# Patient Record
Sex: MALE | Race: WHITE | Employment: UNEMPLOYED | URBAN - METROPOLITAN AREA
[De-identification: names, ages, dates, MRNs, and addresses within clinical notes are randomized per-mention and may not be internally consistent; named-entity substitution may affect disease eponyms.]

---
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
## 2020-07-09 ENCOUNTER — APPOINTMENT (OUTPATIENT)
Dept: GENERAL RADIOLOGY | Age: 20
End: 2020-07-09
Payer: COMMERCIAL

## 2020-07-09 ENCOUNTER — HOSPITAL ENCOUNTER (EMERGENCY)
Age: 20
Discharge: HOME | End: 2020-07-09
Attending: EMERGENCY MEDICINE
Payer: COMMERCIAL

## 2020-07-09 VITALS
HEART RATE: 62 BPM | HEIGHT: 73 IN | BODY MASS INDEX: 20.81 KG/M2 | RESPIRATION RATE: 14 BRPM | WEIGHT: 157 LBS | SYSTOLIC BLOOD PRESSURE: 116 MMHG | OXYGEN SATURATION: 98 % | DIASTOLIC BLOOD PRESSURE: 59 MMHG

## 2020-07-09 LAB
ANION GAP SERPL CALCULATED.3IONS-SCNC: 13 MMOL/L (ref 3–16)
BASOPHILS ABSOLUTE: 0 K/UL (ref 0–0.2)
BASOPHILS RELATIVE PERCENT: 0.3 %
BUN BLDV-MCNC: 14 MG/DL (ref 7–20)
CALCIUM SERPL-MCNC: 9.9 MG/DL (ref 8.3–10.6)
CHLORIDE BLD-SCNC: 98 MMOL/L (ref 99–110)
CO2: 26 MMOL/L (ref 21–32)
CREAT SERPL-MCNC: 0.9 MG/DL (ref 0.9–1.3)
EKG ATRIAL RATE: 57 BPM
EKG DIAGNOSIS: NORMAL
EKG P AXIS: 81 DEGREES
EKG P-R INTERVAL: 132 MS
EKG Q-T INTERVAL: 426 MS
EKG QRS DURATION: 100 MS
EKG QTC CALCULATION (BAZETT): 414 MS
EKG R AXIS: 90 DEGREES
EKG T AXIS: 67 DEGREES
EKG VENTRICULAR RATE: 57 BPM
EOSINOPHILS ABSOLUTE: 0.1 K/UL (ref 0–0.6)
EOSINOPHILS RELATIVE PERCENT: 1.2 %
GFR AFRICAN AMERICAN: >60
GFR NON-AFRICAN AMERICAN: >60
GLUCOSE BLD-MCNC: 103 MG/DL (ref 70–99)
HCT VFR BLD CALC: 45.7 % (ref 40.5–52.5)
HEMOGLOBIN: 15.8 G/DL (ref 13.5–17.5)
LYMPHOCYTES ABSOLUTE: 2.5 K/UL (ref 1–5.1)
LYMPHOCYTES RELATIVE PERCENT: 34.3 %
MCH RBC QN AUTO: 31.1 PG (ref 26–34)
MCHC RBC AUTO-ENTMCNC: 34.6 G/DL (ref 31–36)
MCV RBC AUTO: 89.9 FL (ref 80–100)
MONOCYTES ABSOLUTE: 0.6 K/UL (ref 0–1.3)
MONOCYTES RELATIVE PERCENT: 8.2 %
NEUTROPHILS ABSOLUTE: 4.1 K/UL (ref 1.7–7.7)
NEUTROPHILS RELATIVE PERCENT: 56 %
PDW BLD-RTO: 13.2 % (ref 12.4–15.4)
PLATELET # BLD: 259 K/UL (ref 135–450)
PMV BLD AUTO: 7.7 FL (ref 5–10.5)
POTASSIUM SERPL-SCNC: 3.6 MMOL/L (ref 3.5–5.1)
RBC # BLD: 5.09 M/UL (ref 4.2–5.9)
SODIUM BLD-SCNC: 137 MMOL/L (ref 136–145)
TROPONIN: <0.01 NG/ML
WBC # BLD: 7.4 K/UL (ref 4–11)

## 2020-07-09 PROCEDURE — 84484 ASSAY OF TROPONIN QUANT: CPT

## 2020-07-09 PROCEDURE — 80048 BASIC METABOLIC PNL TOTAL CA: CPT

## 2020-07-09 PROCEDURE — 99285 EMERGENCY DEPT VISIT HI MDM: CPT

## 2020-07-09 PROCEDURE — 93010 ELECTROCARDIOGRAM REPORT: CPT | Performed by: INTERNAL MEDICINE

## 2020-07-09 PROCEDURE — 71046 X-RAY EXAM CHEST 2 VIEWS: CPT

## 2020-07-09 PROCEDURE — 93005 ELECTROCARDIOGRAM TRACING: CPT | Performed by: EMERGENCY MEDICINE

## 2020-07-09 PROCEDURE — 85025 COMPLETE CBC W/AUTO DIFF WBC: CPT

## 2020-07-09 NOTE — ED PROVIDER NOTES
[address] [facility]  ED PROVIDER NOTE    Patient Identification  Pt Name: Richard Moran  MRN: [id number]  Tim 2000  Date of evaluation: 7/9/2020  Provider: Be Gonzales MD  PCP: FORD Mohamud - CNP    Chief Complaint  Chest Pain (patient reports  previous hx of svt, reports that chest pain started 90 minutes ago. )      HPI  History provided by patient   This is a 20 y.o. male who presents to the ED for chest pain. Sharp in character. Seems to have improved by now. Occurred while at rest.  Nonexertional.  Nonpleuritic. No unilateral leg pain or swelling. No fevers or chills or cough. Was feeling dizzy at the time. Had slight nausea. Never had before. Does have history of SVT but this felt different. Did not have palpitations. ROS  10 systems reviewed, pertinent positives/negatives per HPI otherwise noted to be negative. I have reviewed the following nursing documentation:  Allergies: Patient has no known allergies. Past medical history:   Past Medical History:   Diagnosis Date    Migraines     migraines- saw neurology    SVT (supraventricular tachycardia) ([facility] [address] 2017    cardiac cath with ablation     Past surgical history:   Past Surgical History:   Procedure Laterality Date    VENTRICULAR ABLATION SURGERY N/A        Home medications:   Previous Medications    No medications on file       Social history:  reports that he has never smoked. He has never used smokeless tobacco. He reports current alcohol use. He reports that he does not use drugs.     Family history:    Family History   Problem Relation Age of Onset    Other Mother     No Known Problems Father     No Known Problems Brother     Other Maternal Grandmother         anxiety    Thyroid Disease Maternal Grandmother     Arthritis Paternal Grandmother         RA    High Blood Pressure Paternal Grandfather     Thyroid Disease Paternal Grandfather     No Known Problems Brother     Colon Cancer Neg Hx     Breast Cancer Neg Hx     Arrhythmia Neg Hx     Prostate Cancer Neg Hx          Exam  ED Triage Vitals [07/09/20 0347]   BP Temp Temp src Pulse Resp SpO2 Height Weight   (!) 141/90 -- -- 65 16 100 % 6' 1\" (1.854 m) 157 lb (71.2 kg)     Nursing note and vitals reviewed. Constitutional: In no acute distress  HENT:      Head: Normocephalic      Ears: External ears normal.      Nose: Nose normal.     Mouth: Membrane mucosa moist   Eyes: No discharge. Neck: Supple. Trachea midline. Cardiovascular: Regular rate. Warm extremities  Pulmonary/Chest: Effort normal. No respiratory distress. No wheezes. Abdominal: Soft. No distension. Nontender  : Deferred  Rectal: Deferred   Musculoskeletal: Moves all extremities. No gross deformity. Neurological: Alert and oriented. Face symmetric. Speech is clear. Skin: Warm and dry. No rash. Psychiatric: Normal mood and affect. Behavior is normal.    Procedures      EKG  The Ekg interpreted by me in the absence of a cardiologist shows. Willy sinus rhythm. No specific ST-T wave changes appreciated. No evidence of acute ischemia. Radiology  XR CHEST STANDARD (2 VW)    (Results Pending)       Labs  Results for orders placed or performed during the hospital encounter of 07/09/20   CBC Auto Differential   Result Value Ref Range    WBC 7.4 4.0 - 11.0 K/uL    RBC 5.09 4. 20 - 5.90 M/uL    Hemoglobin 15.8 13.5 - 17.5 g/dL    Hematocrit 45.7 40.5 - 52.5 %    MCV 89.9 80.0 - 100.0 fL    MCH 31.1 26.0 - 34.0 pg    MCHC 34.6 31.0 - 36.0 g/dL    RDW 13.2 12.4 - 15.4 %    Platelets 830 090 - 635 K/uL    MPV 7.7 5.0 - 10.5 fL    Neutrophils % 56.0 %    Lymphocytes % 34.3 %    Monocytes % 8.2 %    Eosinophils % 1.2 %    Basophils % 0.3 %    Neutrophils Absolute 4.1 1.7 - 7.7 K/uL    Lymphocytes Absolute 2.5 1.0 - 5.1 K/uL    Monocytes Absolute 0.6 0.0 - 1.3 K/uL    Eosinophils Absolute 0.1 0.0 - 0.6 K/uL    Basophils Absolute 0.0 0.0 - 0.2 K/uL       Screenings           MDM and ED Course  Patient is a 43-year-old man with history of SVT who presents with chest discomfort and dizziness that seems to have improved. EKG shows no acute ischemic changes. Does show bradycardia which is consistent with an athletic appearing male. No history of early cardiac disease in his family. Will obtain troponin x1. No arrhythmia found on EKG. Will watch him on the monitor for any arrhythmia. Will obtain chest x-ray to evaluate for pneumothorax, pneumonia. Low suspicion for these at this time. May be related to anxiety, musculoskeletal, or GERD.  (EMP MDM)    Patient was reassessed. They are feeling well. Objectively they appear to be in no acute distress. Vitals unremarkable for young athletic male. Monitor unremarkable. Discharge instructions, follow up instructions, and return precautions were discussed with the patient and any available family. All questions were answered. [unfilled]    Final Impression  1. Chest pain, unspecified type        Blood pressure (!) 141/90, pulse 65, resp. rate 16, height 6' 1\" (1.854 m), weight 157 lb (71.2 kg), SpO2 100 %. Disposition:  DISPOSITION        Patient Referrals:  No follow-up provider specified. Discharge Medications:  New Prescriptions    No medications on file       Discontinued Medications:  Discontinued Medications    SUMATRIPTAN (IMITREX) 50 MG TABLET    Take 1 tablet by mouth once as needed for Migraine May repeat in 1 hour if no relief. No more than 2 does in 24 hours. VERAPAMIL (VERELAN) 180 MG EXTENDED RELEASE CAPSULE    TAKE 1 CAPSULE BY MOUTH ONE TIME A DAY       This chart was generated using the Dragon dictation system. I created this record but it may contain dictation errors given the limitations of this technology.        Be Gonzales MD  07/09/20 0579

## 2020-07-14 ENCOUNTER — TELEPHONE (OUTPATIENT)
Dept: FAMILY MEDICINE CLINIC | Age: 20
End: 2020-07-14

## 2020-07-20 ENCOUNTER — TELEPHONE (OUTPATIENT)
Dept: FAMILY MEDICINE CLINIC | Age: 20
End: 2020-07-20

## 2020-07-20 PROBLEM — Z86.79 S/P ABLATION OF VENTRICULAR ARRHYTHMIA: Status: ACTIVE | Noted: 2017-08-17

## 2020-07-20 PROBLEM — Z98.890 S/P ABLATION OF VENTRICULAR ARRHYTHMIA: Status: ACTIVE | Noted: 2017-08-17

## 2020-07-20 NOTE — TELEPHONE ENCOUNTER
Called pt   Apt made  Future Appointments   Date Time Provider Aldair Holloway   7/24/2020  1:20 PM Elba Earing, APRN - CNP [facility]  [facility]

## 2020-07-24 ENCOUNTER — OFFICE VISIT (OUTPATIENT)
Dept: FAMILY MEDICINE CLINIC | Age: 20
End: 2020-07-24
Payer: COMMERCIAL

## 2020-07-24 VITALS
DIASTOLIC BLOOD PRESSURE: 72 MMHG | BODY MASS INDEX: 20.32 KG/M2 | RESPIRATION RATE: 18 BRPM | HEART RATE: 96 BPM | WEIGHT: 154 LBS | SYSTOLIC BLOOD PRESSURE: 124 MMHG | OXYGEN SATURATION: 98 % | TEMPERATURE: 99.5 F

## 2020-07-24 LAB
ANION GAP SERPL CALCULATED.3IONS-SCNC: 13 MMOL/L (ref 3–16)
BUN BLDV-MCNC: 13 MG/DL (ref 7–20)
CALCIUM SERPL-MCNC: 10.1 MG/DL (ref 8.3–10.6)
CHLORIDE BLD-SCNC: 105 MMOL/L (ref 99–110)
CO2: 28 MMOL/L (ref 21–32)
CREAT SERPL-MCNC: 0.9 MG/DL (ref 0.9–1.3)
GFR AFRICAN AMERICAN: >60
GFR NON-AFRICAN AMERICAN: >60
GLUCOSE BLD-MCNC: 86 MG/DL (ref 70–99)
POTASSIUM SERPL-SCNC: 4.2 MMOL/L (ref 3.5–5.1)
SODIUM BLD-SCNC: 146 MMOL/L (ref 136–145)

## 2020-07-24 PROCEDURE — 99395 PREV VISIT EST AGE 18-39: CPT | Performed by: NURSE PRACTITIONER

## 2020-07-24 ASSESSMENT — ENCOUNTER SYMPTOMS
NAUSEA: 0
DIARRHEA: 0
SHORTNESS OF BREATH: 0
COLOR CHANGE: 0
SINUS PRESSURE: 0
EYE REDNESS: 0
TROUBLE SWALLOWING: 0
ABDOMINAL PAIN: 0
WHEEZING: 0
COUGH: 0
SORE THROAT: 0
EYE ITCHING: 0
CONSTIPATION: 0
CHEST TIGHTNESS: 0

## 2020-07-24 ASSESSMENT — PATIENT HEALTH QUESTIONNAIRE - PHQ9
1. LITTLE INTEREST OR PLEASURE IN DOING THINGS: 0
SUM OF ALL RESPONSES TO PHQ QUESTIONS 1-9: 0
2. FEELING DOWN, DEPRESSED OR HOPELESS: 0
SUM OF ALL RESPONSES TO PHQ9 QUESTIONS 1 & 2: 0
SUM OF ALL RESPONSES TO PHQ QUESTIONS 1-9: 0

## 2020-07-24 NOTE — PROGRESS NOTES
Kj Avila  YOB: 2000    Kj Avila    Date of Service:  7/24/2020    Chief Complaint:   Kj Avila is a 20 y.o. male who presents for complete physical examination. HPI: Jeff Schrader is here for his annual exam. He is going back to school and hoping to still play soccer. He has noted some intermittent tachycardia that is not associated with activity over the past couple of months. He also has noted 2 episodes of sharp chest pain. The last took him to the ED. Workup there was normal, he states the pain last less than a minute and is located on various parts of his chest. It is sharp and stabbing. He does not report feeling overly anxious    Wt Readings from Last 3 Encounters:   07/24/20 154 lb (69.9 kg)   07/09/20 157 lb (71.2 kg)   09/12/17 140 lb (63.5 kg) (35 %, Z= -0.38)*     * Growth percentiles are based on CDC (Boys, 2-20 Years) data.        BP Readings from Last 3 Encounters:   07/24/20 124/72   07/09/20 (!) 116/59   09/12/17 109/71       Vitals:    07/24/20 1322   BP: 124/72   Site: Left Upper Arm   Position: Sitting   Pulse: 96   Resp: 18   Temp: 99.5 °F (37.5 °C)   TempSrc: Infrared   SpO2: 98%   Weight: 154 lb (69.9 kg)       Patient Active Problem List   Diagnosis    Classical migraine with intractable migraine    SVT (supraventricular tachycardia) (HCC)    S/P ablation of ventricular arrhythmia       Preventive Care:  Health Maintenance   Topic Date Due    HIV screen  08/06/2014    Flu vaccine (1) 09/01/2020    DTaP/Tdap/Td vaccine (7 - Td) 09/03/2020    Hepatitis A vaccine  Completed    Hepatitis B vaccine  Completed    Hib vaccine  Completed    HPV vaccine  Completed    Varicella vaccine  Completed    Meningococcal (ACWY) vaccine  Completed    Pneumococcal 0-64 years Vaccine  Aged Out      Last eye exam: 2018, normal  Exercise: running, hiking,   Seatbelt use: yes  Lipid panel: No results found for: CHOL, TRIG, HDL, LDLCALC, LDLDIRECT   Screenings due: none       Immunization History   Administered Date(s) Administered    DTaP (Infanrix) 2000, 02/07/2000, 06/14/2000, 03/16/2001, 09/08/2004    HPV 9-valent Charma Lint) 07/23/2015    HPV Quadrivalent (Gardasil) 07/29/2014    Hepatitis A Ped/Adol (Havrix, Vaqta) 06/04/2012, 07/29/2014    Hepatitis B (Recombivax HB) 2000, 06/14/2000, 03/16/2001    Hepatitis B Adol 2 Dose (Recombivax HB) 1999, 06/14/2000, 03/16/2001    Hib PRP-OMP (PedvaxHIB) 1999, 02/07/2000, 06/14/2000, 03/16/2001    Influenza Vaccine, unspecified formulation 12/08/2003, 01/04/2004    Influenza Virus Vaccine 12/08/2003, 01/04/2004    MMR 03/16/2001, 07/22/2003    Meningococcal MCV4P (Menactra) 09/03/2010, 07/24/2017    Pneumococcal Conjugate 7-valent (Prevnar7) 03/16/2001    Polio IPV (IPOL) 1999, 02/07/2000, 06/14/2000, 09/08/2004    Tdap (Boostrix, Adacel) 09/03/2010    Varicella (Varivax) 08/30/2000, 08/30/2001, 01/07/2008       No Known Allergies    No current outpatient medications on file. No current facility-administered medications for this visit.         Past Medical History:   Diagnosis Date    Migraines     migraines- saw neurology    SV[facility]ra[facility]ricular tachycardia) ([facility] [address] 2017    cardiac cath with ablation       Past Surgical History:   Procedure Laterality Date    VENTRICULAR ABLATION SURGERY N/A        Family History   Problem Relation Age of Onset    Other Mother     No Known Problems Father     No Known Problems Brother     Other Maternal Grandmother         anxiety    Thyroid Disease Maternal Grandmother     Arthritis Paternal Grandmother         RA    High Blood Pressure Paternal Grandfather     Thyroid Disease Paternal Grandfather     No Known Problems Brother     Colon Cancer Neg Hx     Breast Cancer Neg Hx     Arrhythmia Neg Hx     Prostate Cancer Neg Hx        Social History     Socioeconomic History    Marital status: Single     Spouse name: Not on file    Number of children: Not on file    Years of education: Not on file    Highest education level: Not on file   Occupational History    Occupation: student   Social Needs    Financial resource strain: Not on file    Food insecurity     Worry: Not on file     Inability: Not on file   [employer] needs     Medical: Not on file     Non-medical: Not on file   Tobacco Use    Smoking status: Never Smoker    Smokeless tobacco: Never Used   Substance and Sexual Activity    Alcohol use: Yes     Comment: rare , once every month , family events     Drug use: No    Sexual activity: Yes     Partners: Female     Birth control/protection: Condom   Lifestyle    Physical activity     Days per week: Not on file     Minutes per session: Not on file    Stress: Not on file   Relationships    Social connections     Talks on phone: Not on file     Gets together: Not on file     Attends Voodoo service: Not on file     Active member of club or organization: Not on file     Attends meetings of clubs or organizations: Not on file     Relationship status: Not on file    Intimate partner violence     Fear of current or ex partner: Not on file     Emotionally abused: Not on file     Physically abused: Not on file     Forced sexual activity: Not on file   Other Topics Concern    Not on file   Social History Narrative    Plays soccer- trying to gain muscle mass to play in college     does well in school       Review of Systems:  Review of Systems   Constitutional: Negative for activity change, chills, fatigue, fever and unexpected weight change. HENT: Negative for ear discharge, mouth sores, postnasal drip, sinus pressure, sore throat and trouble swallowing. Eyes: Negative for redness, itching and visual disturbance. Respiratory: Negative for cough, chest tightness, shortness of breath and wheezing. Cardiovascular: Positive for palpitations. Negative for chest pain and leg swelling.         Some tachycardia Gastrointestinal: Negative for abdominal pain, constipation, diarrhea and nausea. Endocrine: Negative for cold intolerance, heat intolerance, polydipsia, polyphagia and polyuria. Genitourinary: Negative for dysuria, frequency and urgency. Musculoskeletal: Negative for arthralgias, joint swelling and myalgias. Skin: Negative for color change, pallor and rash. Allergic/Immunologic: Negative for environmental allergies, food allergies and immunocompromised state. Neurological: Negative for dizziness, syncope, weakness and headaches. Hematological: Does not bruise/bleed easily. Psychiatric/Behavioral: Negative for behavioral problems, hallucinations and sleep disturbance. The patient is not nervous/anxious. Physical Exam:     Body mass index is 20.32 kg/m². Physical Exam  Vitals signs and nursing note reviewed. Constitutional:       General: He is not in acute distress. Appearance: He is well-developed. He is not ill-appearing or diaphoretic. HENT:      Head: Normocephalic and atraumatic. Right Ear: Tympanic membrane, ear canal and external ear normal.      Left Ear: Tympanic membrane, ear canal and external ear normal.      Nose: Nose normal.      Mouth/Throat:      Mouth: Mucous membranes are moist.      Pharynx: Oropharynx is clear. No oropharyngeal exudate. Eyes:      General: No scleral icterus. Right eye: No discharge. Left eye: No discharge. Conjunctiva/sclera: Conjunctivae normal.      Pupils: Pupils are equal, round, and reactive to light. Neck:      Musculoskeletal: Normal range of motion and neck supple. Thyroid: No thyromegaly. Vascular: No JVD. Cardiovascular:      Rate and Rhythm: Normal rate and regular rhythm. Heart sounds: Normal heart sounds. No murmur. No friction rub. No gallop. Pulmonary:      Effort: Pulmonary effort is normal. No respiratory distress. Breath sounds: Normal breath sounds.  No wheezing or screen- low risk factors      No orders of the defined types were placed in this encounter. Orders Placed This Encounter   Procedures    HIV-1 AND HIV-2 ANTIBODIES     Standing Status:   Future     Standing Expiration Date:   7/24/2021    BASIC METABOLIC PANEL     Standing Status:   Future     Standing Expiration Date:   7/24/2021   [facility] Cardiology     Referral Priority:   Urgent     Referral Type:   Eval and Treat     Referral Reason:   Specialty Services Required     Referral Location:   [facility] CARDIOLOGY     Requested Specialty:   Pediatric Cardiology     Number of Visits Requested:   1       Patient Education:    Cody Prieto on importance of healthy diet and regular exercise of at least 30 minutes on four or more days during the week. Counseled on skin safety, SPF 27 or higher prior to going outdoors and reapplication every twohours while outside.  Monitor moles for changes, report to provider if greater than 6 mm, color variations, asymmetry, redness, scales, and/or overlying skin changes  Counseled on safety, wear seatbelt, do not consumealcohol and drive or drive with anyone who has consumed alcohol  Counseled on safe sex practices, wear condoms, limit number of sexual partners  Counseled on importance of monthly self testicular exams, monitor for newlumps/bumps/masses, tenderness, new asymmetry, redness, and overlying skin changes, report to provider    Follow Up  1 year or as needed

## 2020-07-25 LAB
HIV AG/AB: NORMAL
HIV ANTIGEN: NORMAL
HIV-1 ANTIBODY: NORMAL
HIV-2 AB: NORMAL

## 2020-08-25 ENCOUNTER — PATIENT MESSAGE (OUTPATIENT)
Dept: FAMILY MEDICINE CLINIC | Age: 20
End: 2020-08-25

## 2020-08-25 NOTE — TELEPHONE ENCOUNTER
From: Tremayne Rosenbaum  To: FORD Guzman CNP  Sent: 8/25/2020 4:48 PM EDT  Subject: Non-Urgent Medical Question    I came in for a physical in early August and forgot the paper so I attached it in this email. If it can be filled out and sent back to me that would be great. I apologize for any inconveniences!

## 2020-09-01 ENCOUNTER — TELEPHONE (OUTPATIENT)
Dept: FAMILY MEDICINE CLINIC | Age: 20
End: 2020-09-01

## 2020-11-04 ENCOUNTER — OFFICE VISIT (OUTPATIENT)
Dept: PRIMARY CARE CLINIC | Age: 20
End: 2020-11-04
Payer: COMMERCIAL

## 2020-11-04 PROCEDURE — 99211 OFF/OP EST MAY X REQ PHY/QHP: CPT | Performed by: NURSE PRACTITIONER

## 2020-11-04 NOTE — PROGRESS NOTES
Khari Lopez received a viral test for COVID-19. They were educated on isolation and quarantine as appropriate. For any symptoms, they were directed to seek care from their PCP, given contact information to establish with a doctor, directed to an urgent care or the emergency room.

## 2020-11-04 NOTE — PATIENT INSTRUCTIONS

## 2020-11-06 LAB — SARS-COV-2, NAA: DETECTED

## 2021-01-11 DIAGNOSIS — U07.1 COVID-19: Primary | ICD-10-CM

## 2021-01-11 DIAGNOSIS — Z86.79 HISTORY OF PAROXYSMAL SUPRAVENTRICULAR TACHYCARDIA: ICD-10-CM

## 2021-01-22 ENCOUNTER — HOSPITAL ENCOUNTER (OUTPATIENT)
Dept: NON INVASIVE DIAGNOSTICS | Age: 21
Discharge: HOME | End: 2021-01-22
Payer: COMMERCIAL

## 2021-01-22 ENCOUNTER — HOSPITAL ENCOUNTER (OUTPATIENT)
Age: 21
Discharge: HOME | End: 2021-01-22
Payer: COMMERCIAL

## 2021-01-22 DIAGNOSIS — U07.1 COVID-19: ICD-10-CM

## 2021-01-22 DIAGNOSIS — Z86.79 HISTORY OF PAROXYSMAL SUPRAVENTRICULAR TACHYCARDIA: ICD-10-CM

## 2021-01-22 LAB
LV EF: 53 %
LVEF MODALITY: NORMAL

## 2021-01-22 PROCEDURE — 93005 ELECTROCARDIOGRAM TRACING: CPT

## 2021-01-22 PROCEDURE — 93306 TTE W/DOPPLER COMPLETE: CPT

## 2021-01-22 NOTE — RESULT ENCOUNTER NOTE
Please call patient and let them know that the EKG and echo were normal.  He is needing a note allowing him to return to sports after his Covid infection. It is appropriate to allow him to return at this time. Please write a note and call the patient and let him know he can pick it up.

## 2021-01-23 LAB
EKG ATRIAL RATE: 67 BPM
EKG DIAGNOSIS: NORMAL
EKG P AXIS: 73 DEGREES
EKG P-R INTERVAL: 136 MS
EKG Q-T INTERVAL: 402 MS
EKG QRS DURATION: 94 MS
EKG QTC CALCULATION (BAZETT): 424 MS
EKG R AXIS: 88 DEGREES
EKG T AXIS: 71 DEGREES
EKG VENTRICULAR RATE: 67 BPM

## 2021-01-23 PROCEDURE — 93010 ELECTROCARDIOGRAM REPORT: CPT | Performed by: INTERNAL MEDICINE

## 2021-08-26 ENCOUNTER — TELEPHONE (OUTPATIENT)
Dept: FAMILY MEDICINE CLINIC | Age: 21
End: 2021-08-26

## 2021-08-26 ENCOUNTER — TELEMEDICINE (OUTPATIENT)
Dept: FAMILY MEDICINE CLINIC | Age: 21
End: 2021-08-26
Payer: COMMERCIAL

## 2021-08-26 DIAGNOSIS — F32.A ANXIETY AND DEPRESSION: Primary | ICD-10-CM

## 2021-08-26 DIAGNOSIS — F41.9 ANXIETY AND DEPRESSION: Primary | ICD-10-CM

## 2021-08-26 PROCEDURE — 99214 OFFICE O/P EST MOD 30 MIN: CPT | Performed by: NURSE PRACTITIONER

## 2021-08-26 RX ORDER — FLUOXETINE 10 MG/1
10 CAPSULE ORAL DAILY
Qty: 30 CAPSULE | Refills: 3 | Status: SHIPPED | OUTPATIENT
Start: 2021-08-26

## 2021-08-26 SDOH — ECONOMIC STABILITY: HOUSING INSECURITY
IN THE LAST 12 MONTHS, WAS THERE A TIME WHEN YOU DID NOT HAVE A STEADY PLACE TO SLEEP OR SLEPT IN A SHELTER (INCLUDING NOW)?: NO

## 2021-08-26 SDOH — ECONOMIC STABILITY: INCOME INSECURITY: IN THE LAST 12 MONTHS, WAS THERE A TIME WHEN YOU WERE NOT ABLE TO PAY THE MORTGAGE OR RENT ON TIME?: NO

## 2021-08-26 SDOH — ECONOMIC STABILITY: FOOD INSECURITY: WITHIN THE PAST 12 MONTHS, THE FOOD YOU BOUGHT JUST DIDN'T LAST AND YOU DIDN'T HAVE MONEY TO GET MORE.: NEVER TRUE

## 2021-08-26 SDOH — ECONOMIC STABILITY: TRANSPORTATION INSECURITY
IN THE PAST 12 MONTHS, HAS LACK OF TRANSPORTATION KEPT YOU FROM MEETINGS, WORK, OR FROM GETTING THINGS NEEDED FOR DAILY LIVING?: NO

## 2021-08-26 SDOH — ECONOMIC STABILITY: FOOD INSECURITY: WITHIN THE PAST 12 MONTHS, YOU WORRIED THAT YOUR FOOD WOULD RUN OUT BEFORE YOU GOT MONEY TO BUY MORE.: NEVER TRUE

## 2021-08-26 SDOH — ECONOMIC STABILITY: TRANSPORTATION INSECURITY
IN THE PAST 12 MONTHS, HAS THE LACK OF TRANSPORTATION KEPT YOU FROM MEDICAL APPOINTMENTS OR FROM GETTING MEDICATIONS?: NO

## 2021-08-26 ASSESSMENT — PATIENT HEALTH QUESTIONNAIRE - PHQ9
SUM OF ALL RESPONSES TO PHQ9 QUESTIONS 1 & 2: 2
SUM OF ALL RESPONSES TO PHQ QUESTIONS 1-9: 2
1. LITTLE INTEREST OR PLEASURE IN DOING THINGS: 1
SUM OF ALL RESPONSES TO PHQ QUESTIONS 1-9: 2
2. FEELING DOWN, DEPRESSED OR HOPELESS: 1
SUM OF ALL RESPONSES TO PHQ QUESTIONS 1-9: 2

## 2021-08-26 ASSESSMENT — ENCOUNTER SYMPTOMS
GASTROINTESTINAL NEGATIVE: 1
RESPIRATORY NEGATIVE: 1

## 2021-08-26 ASSESSMENT — SOCIAL DETERMINANTS OF HEALTH (SDOH): HOW HARD IS IT FOR YOU TO PAY FOR THE VERY BASICS LIKE FOOD, HOUSING, MEDICAL CARE, AND HEATING?: NOT HARD AT ALL

## 2021-08-26 NOTE — PATIENT INSTRUCTIONS
Patient Education        fluoxetine  Pronunciation:  claude HERNÁNDEZ e teen  Brand:  PROzac, Sarafem  What is the most important information I should know about fluoxetine? You should not use fluoxetine if you also take pimozide or thioridazine. Do not use this medicine if you have used an MAO inhibitor in the past 14 days, such as isocarboxazid, linezolid, methylene blue injection, phenelzine, rasagiline, selegiline, or tranylcypromine. Wait at least 14 days after stopping an MAO inhibitor before you take fluoxetine. Wait 5 weeks after stopping fluoxetine before you take thioridazine or an MAOI. Some young people have thoughts about suicide when first taking an antidepressant. Stay alert to changes in your mood or symptoms. Report any new or worsening symptoms to your doctor. What is fluoxetine? Fluoxetine is a selective serotonin reuptake inhibitors (SSRI) antidepressant. Fluoxetine is used to treat major depressive disorder, bulimia nervosa (an eating disorder) obsessive-compulsive disorder, panic disorder, and premenstrual dysphoric disorder (PMDD). Fluoxetine is sometimes used together with olanzapine (Zyprexa) to treat manic depression caused by bipolar disorder. This combination is also used to treat depression after at least 2 other medications have failed. If you also take olanzapine (Zyprexa), read the Zyprexa medication guide and all patient warnings and instructions provided with that medication. Fluoxetine may also be used for purposes not listed in this medication guide. What should I discuss with my healthcare provider before taking fluoxetine? You should not use fluoxetine if you are allergic to it, if you also take pimozide or thioridazine. Do not use fluoxetine if you have used an MAO inhibitor in the past 14 days. A dangerous drug interaction could occur. MAO inhibitors include isocarboxazid, linezolid, methylene blue injection, phenelzine, rasagiline, selegiline, and tranylcypromine.  You must wait at least 14 days after stopping an MAO inhibitor before you take fluoxetine. You must wait 5 weeks after stopping fluoxetine before you can take thioridazine or an MAOI. Tell your doctor about all other antidepressants you take, especially Celexa, Cymbalta, Desyrel, Effexor, Lexapro, Luvox, Oleptro, Paxil, Pexeva, Symbyax, Viibryd, or Zoloft. Tell your doctor if you have ever had:  · cirrhosis of the liver;  · urination problems;  · diabetes;  · narrow-angle glaucoma;  · seizures or epilepsy;  · bipolar disorder (manic depression);  · drug abuse or suicidal thoughts; or  · electroconvulsive therapy (ECT). Some young people have thoughts about suicide when first taking an antidepressant. Your doctor should check your progress at regular visits. Your family or other caregivers should also be alert to changes in your mood or symptoms. Older adults may be more sensitive to the effects of this medicine. Ask your doctor about taking this medicine if you are pregnant. Taking an SSRI antidepressant during late pregnancy may cause serious medical complications in the baby. However, you may have a relapse of depression if you stop taking your antidepressant. Tell your doctor right away if you become pregnant. If you are pregnant, your name may be listed on a pregnancy registry to track the effects of fluoxetine on the baby. If you are breastfeeding, tell your doctor if you notice agitation, fussiness, feeding problems, or poor weight gain in the nursing baby. Fluoxetine is not approved for use by anyone younger than 25years old. How should I take fluoxetine? Follow all directions on your prescription label and read all medication guides or instruction sheets. Your doctor may occasionally change your dose. Use the medicine exactly as directed. Swallow the delayed-release capsule whole and do not crush, chew, break, or open it. Measure liquid medicine carefully.  Use the dosing syringe provided, or use a medicine dose-measuring device (not a kitchen spoon). It may take up to 4 weeks before your symptoms improve. Keep using the medication as directed and tell your doctor if your symptoms do not improve. Do not stop using fluoxetine suddenly, or you could have unpleasant withdrawal symptoms. Ask your doctor how to safely stop using fluoxetine. Store at room temperature away from moisture and heat. What happens if I miss a dose? Take the medicine as soon as you can, but skip the missed dose if it is almost time for your next dose. Do not take two doses at one time. If you miss a dose of Prozac Weekly, take the missed dose as soon as you remember and take the next dose 7 days later. However, if it is almost time for the next regularly scheduled weekly dose, skip the missed dose and take the next one as directed. Do not take extra medicine to make up the missed dose. What happens if I overdose? Seek emergency medical attention or call the Poison Help line at 1-666.885.2043. What should I avoid while taking fluoxetine? Drinking alcohol can increase certain side effects of fluoxetine. Avoid driving or hazardous activity until you know how this medicine will affect you. Your reactions could be impaired. What are the possible side effects of fluoxetine? Get emergency medical help if you have signs of an allergic reaction (hives, difficult breathing, swelling in your face or throat) or a severe skin reaction (fever, sore throat, burning eyes, skin pain, red or purple skin rash with blistering and peeling). Report any new or worsening symptoms to your doctor, such as: mood or behavior changes, anxiety, panic attacks, trouble sleeping, or if you feel impulsive, irritable, agitated, hostile, aggressive, restless, hyperactive (mentally or physically), more depressed, or have thoughts about suicide or hurting yourself.   Call your doctor at once if you have:  · blurred vision, tunnel vision, eye pain or swelling, or seeing halos around lights;  · fast or pounding heartbeats, fluttering in your chest, shortness of breath, and sudden dizziness (like you might pass out);  · low levels of sodium in the body --headache, confusion, slurred speech, severe weakness, vomiting, loss of coordination, feeling unsteady; or  · severe nervous system reaction --very stiff (rigid) muscles, high fever, sweating, confusion, fast or uneven heartbeats, tremors, feeling like you might pass out. Seek medical attention right away if you have symptoms of serotonin syndrome, such as: agitation, hallucinations, fever, sweating, shivering, fast heart rate, muscle stiffness, twitching, loss of coordination, nausea, vomiting, or diarrhea. Common side effects may include:  · sleep problems (insomnia), strange dreams;  · headache, dizziness, drowsiness, vision changes;  · tremors or shaking, feeling anxious or nervous;  · pain, weakness, yawning, tired feeling;  · upset stomach, loss of appetite, nausea, vomiting, diarrhea;  · dry mouth, sweating, hot flashes;  · changes in weight or appetite;  · stuffy nose, sinus pain, sore throat, flu symptoms; or  · decreased sex drive, impotence, or difficulty having an orgasm. This is not a complete list of side effects and others may occur. Call your doctor for medical advice about side effects. You may report side effects to FDA at 4-560-FDA-2219. What other drugs will affect fluoxetine? Fluoxetine can cause a serious heart problem. Your risk may be higher if you also use certain other medicines for infections, asthma, heart problems, high blood pressure, depression, mental illness, cancer, malaria, or HIV. Using fluoxetine with other drugs that make you drowsy can worsen this effect. Ask your doctor before using opioid medication, a sleeping pill, a muscle relaxer, or medicine for anxiety or seizures.   Ask your doctor before taking a nonsteroidal anti-inflammatory drug (NSAID) such as aspirin, ibuprofen (Advil, licensed healthcare practitioners in caring for their patients and/or to serve consumers viewing this service as a supplement to, and not a substitute for, the expertise, skill, knowledge and judgment of healthcare practitioners. The absence of a warning for a given drug or drug combination in no way should be construed to indicate that the drug or drug combination is safe, effective or appropriate for any given patient. [facility] does not assume any responsibility for any aspect of healthcare administered with the aid of information [facility] provides. The information contained herein is not intended to cover all possible uses, directions, precautions, warnings, drug interactions, allergic reactions, or adverse effects. If you have questions about the drugs you are taking, check with your doctor, nurse or pharmacist.  Copyright 2124-4475 [address]. Version: 25.02. Revision date: 8/3/2020. Care instructions adapted under license by [facility] ([facility]). If you have questions about a medical condition or this instruction, always ask your healthcare professional. [place] [address] any warranty or liability for your use of this information. Patient Education        Recovering From Depression: Care Instructions  Your Care Instructions     Taking good care of yourself is important as you recover from depression. In time, your symptoms will fade as your treatment takes hold. Do not give up. Instead, focus your energy on getting better. Your mood will improve. It just takes some time. Focus on things that can help you feel better, such as being with friends and family, eating well, and getting enough rest. But take things slowly. Do not do too much too soon. You will begin to feel better gradually. Follow-up care is a key part of your treatment and safety. Be sure to make and go to all appointments, and call your doctor if you are having problems.  It's also a good idea to know your test results and keep a list of the medicines you take. How can you care for yourself at home? Be realistic  · If you have a large task to do, break it up into smaller steps you can handle, and just do what you can. · You may want to put off important decisions until your depression has lifted. If you have plans that will have a major impact on your life, such as marriage, divorce, or a job change, try to wait a bit. Talk it over with friends and loved ones who can help you look at the overall picture first.  · Reaching out to people for help is important. Do not isolate yourself. Let your family and friends help you. Find someone you can trust and confide in, and talk to that person. · Be patient, and be kind to yourself. Remember that depression is not your fault and is not something you can overcome with willpower alone. Treatment is important for depression, just like for any other illness. Feeling better takes time, and your mood will improve little by little. Stay active  · Stay busy and get outside. Take a walk, or try some other light exercise. · Talk with your doctor about an exercise program. Exercise can help with mild depression. · Go to a movie or concert. Take part in a Jew activity or other social gathering. Go to a ball game. · Ask a friend to have dinner with you. Take care of yourself  · Eat a balanced diet with plenty of fresh fruits and vegetables, whole grains, and lean protein. If you have lost your appetite, eat small snacks rather than large meals. · Avoid using illegal drugs or marijuana and drinking alcohol. Do not take medicines that have not been prescribed for you. They may interfere with medicines you may be taking for depression, or they may make your depression worse. · Take your medicines exactly as they are prescribed. You may start to feel better within 1 to 3 weeks of taking antidepressant medicine. But it can take as many as 6 to 8 weeks to see more improvement.  If you have questions or concerns about your medicines, or if you do not notice any improvement by 3 weeks, talk to your doctor. · Continue to take your medicine after your symptoms improve. Taking your medicine for at least 6 months after you feel better can help keep you from getting depressed again. If this isn't the first time you have been depressed, your doctor may recommend you to take medicine even longer. · If you have any side effects from your medicine, tell your doctor. Many side effects are mild and will go away on their own after you have been taking the medicine for a few weeks. Some may last longer. Talk to your doctor if side effects are bothering you too much. You might be able to try a different medicine. · Continue counseling. It may help prevent depression from returning, especially if you've had multiple episodes of depression. Talk with your counselor if you are having a hard time attending your sessions or you think the sessions aren't working. Don't just stop going. · Get enough sleep. Talk to your doctor if you are having problems sleeping. · Avoid sleeping pills unless they are prescribed by the doctor treating your depression. Sleeping pills may make you groggy during the day, and they may interact with other medicine you are taking. · If you have any other illnesses, such as diabetes, heart disease, or high blood pressure, make sure to continue with your treatment. Tell your doctor about all of the medicines you take, including those with or without a prescription. · If you or someone you know talks about suicide, self-harm, or feeling hopeless, get help right away. Call the [address] [place] [address] at 6-511-485-WRPM (3-960.827.3373) or text HOME to 378241 to access the Crisis Text Line. Consider saving these numbers in your phone. When should you call for help? Call 911 anytime you think you may need emergency care. For example, call if:    · You feel like hurting yourself or someone else.   · Someone you know has depression and is about to attempt or is attempting suicide. Call your doctor now or seek immediate medical care if:    · You hear voices.     · Someone you know has depression and:  ? Starts to give away his or her possessions. ? Uses illegal drugs or drinks alcohol heavily. ? Talks or writes about death, including writing suicide notes or talking about guns, knives, or pills. ? Starts to spend a lot of time alone. ? Acts very aggressively or suddenly appears calm. Watch closely for changes in your health, and be sure to contact your doctor if:    · You do not get better as expected. Where can you learn more? Go to https://Primitive Makeuppepiceweb.[employer]. org and sign in to your [employer] account. Enter P064 in the Bloglovin box to learn more about \"Recovering From Depression: Care Instructions. \"     If you do not have an account, please click on the \"Sign Up Now\" link. Current as of: September 23, 2020               Content Version: 12.9  © 2006-2021 [employer]. Care instructions adapted under license by [facility] ([facility]). If you have questions about a medical condition or this instruction, always ask your healthcare professional. [place] [address] any warranty or liability for your use of this information. Patient Education        Anxiety Disorder: Care Instructions  Your Care Instructions     Anxiety is a normal reaction to stress. Difficult situations can cause you to have symptoms such as sweaty palms and a nervous feeling. In an anxiety disorder, the symptoms are far more severe. Constant worry, muscle tension, trouble sleeping, nausea and diarrhea, and other symptoms can make normal daily activities difficult or impossible. These symptoms may occur for no reason, and they can affect your work, school, or social life. Medicines, counseling, and self-care can all help. Follow-up care is a key part of your treatment and safety. Be sure to make and go to all appointments, and call your doctor if you are having problems. It's also a good idea to know your test results and keep a list of the medicines you take. How can you care for yourself at home? · Take medicines exactly as directed. Call your doctor if you think you are having a problem with your medicine. · Go to your counseling sessions and follow-up appointments. · Recognize and accept your anxiety. Then, when you are in a situation that makes you anxious, say to yourself, \"This is not an emergency. I feel uncomfortable, but I am not in danger. I can keep going even if I feel anxious. \"  · Be kind to your body:  ? Relieve tension with exercise or a massage. ? Get enough rest.  ? Avoid alcohol, caffeine, nicotine, and illegal drugs. They can increase your anxiety level and cause sleep problems. ? Learn and do relaxation techniques. See below for more about these techniques. · Engage your mind. Get out and do something you enjoy. Go to a funny movie, or take a walk or hike. Plan your day. Having too much or too little to do can make you anxious. · Keep a record of your symptoms. Discuss your fears with a good friend or family member, or join a support group for people with similar problems. Talking to others sometimes relieves stress. · Get involved in social groups, or volunteer to help others. Being alone sometimes makes things seem worse than they are. · Get at least 30 minutes of exercise on most days of the week to relieve stress. Walking is a good choice. You also may want to do other activities, such as running, swimming, cycling, or playing tennis or team sports. Relaxation techniques  Do relaxation exercises 10 to 20 minutes a day. You can play soothing, relaxing music while you do them, if you wish. · Tell others in your house that you are going to do your relaxation exercises. Ask them not to disturb you.   · Find a comfortable place, away from all distractions and those you had before. Where can you learn more? Go to https://chpepiceweb.[email or web address] and sign in to your [facility] account. Enter P754 in the Citybot box to learn more about \"Anxiety Disorder: Care Instructions. \"     If you do not have an account, please click on the \"Sign Up Now\" link. Current as of: September 23, 2020               Content Version: 12.9  © 2006-2021 [employer]. Care instructions adapted under license by [facility] ([facility]). If you have questions about a medical condition or this instruction, always ask your healthcare professional. [address] any warranty or liability for your use of this information.

## 2021-08-26 NOTE — PROGRESS NOTES
2021    TELEHEALTH EVALUATION -- Audio/Visual (During OSDZN-70 public health emergency)    HPI:    Beau Malin (:  2000) has requested an audio/video evaluation for the following concern(s):    Chief Complaint   Patient presents with    Medication Check    Anxiety     parents struggle with anxiety - realizing effects of it more now         Rosalind Sabillon is seen today to discuss his depression and anxiety. He states that ever since he started school he has struggled with some degree of depression or anxiety. He really enjoys being around friends and playing soccer, but never really liked the school. This has caused him to struggle with motivation. He was not completing assignments and therefore got poor grades. He is not currently playing soccer because of it. He feels the covid pandemic may be contributing because he was in quarantine for almost a month. This led him to feel more down and then not do his work. The poor grades then gave him more anxiety which in turn made his depression worse. He notes self care is good. He denies any suicidal ideation. He is open to seeing a counselor and is motivated to try and make his senior year better. He is interested in medication to help with his mood. Over the last 2 weeks, how often have you been bothered by any of the following problems? Little interest or pleasure in doing things Several days  Feeling down, depressed, or hopeless Several days  Over the last 2 weeks, how often have you been bothered by any of the following problems?    Trouble falling or staying asleep, or sleeping too much Several days  Feeling tired or having little energy Several days  Poor appetite or overeating Several days  Feeling bad about yourself - or that you are a failure or have let yourself or your family down Several days  Trouble concentrating on things, such as reading the newspaper or watching television Several days  Moving or speaking so slowly that other people could have noticed. Or the opposite - being so fidgety or restless that you have been moving around a lot more than usual Several days  Thoughts that you would be better off dead, or of hurting yourself in some way Not at all  If you checked off any problems, how difficult have these problems made it for you to do your work, take care of things at home, or get along with other people? Very difficult  PHQ-9 Score (Depression Severity Scale: 0-4 = Minimal or None, 5-9 = Mild, 10-14 = Moderate, 15-19 = Moderately Severe, 20-27 = Severe) (range: 0 - 27) 8  Over the last 2 weeks, how often have you been bothered by the following problems? Feeling nervous, anxious, or on edge? Several days  Not being able to stop or control worrying? Several days  Worrying too much about different things? Several days   Trouble relaxing? Several days  Being so restless that it's hard to sit still? Several days  Becoming easily annoyed or irritable? Several days  Feeling afraid as if something awful might happen? Several days  WARD-7 Score: (5-9 = Mild, 10-14 = Moderate, >15 = Severe)   (range: 0 - 21) 0            Review of Systems   Constitutional: Positive for activity change. Negative for fatigue. Respiratory: Negative. Cardiovascular: Negative. Gastrointestinal: Negative. Genitourinary: Negative. Psychiatric/Behavioral: Positive for behavioral problems (not completing assignments- improved since returning to school this year) and dysphoric mood. Negative for self-injury, sleep disturbance and suicidal ideas. The patient is nervous/anxious. Mother reports poor performance at school, poor motivation, and poor/limited interactions with others/peers.  He is on academic suspension from soccer (which he use to love)       Prior to Visit Medications    Not on File       Social History     Tobacco Use    Smoking status: Never Smoker    Smokeless tobacco: Never Used   Vaping Use    Vaping Use: Some days    Substances: Occasionally   Substance Use Topics    Alcohol use: Yes     Comment: rare , once every month , family events     Drug use: No        No Known Allergies    PHYSICAL EXAMINATION:  Patient-Reported Vitals 8/26/2021   Patient-Reported Weight 147lb couple wks ago   Patient-Reported Height 6'1\"         Physical Exam  Constitutional:       Appearance: Normal appearance. HENT:      Head: Normocephalic and atraumatic. Right Ear: External ear normal.      Left Ear: External ear normal.      Nose: Nose normal. No rhinorrhea. Mouth/Throat:      Pharynx: Oropharynx is clear. Eyes:      General:         Right eye: No discharge. Left eye: No discharge. Conjunctiva/sclera: Conjunctivae normal.   Neck:      Trachea: Phonation normal.   Pulmonary:      Effort: Pulmonary effort is normal. No respiratory distress. Musculoskeletal:      Cervical back: Normal range of motion. Skin:     Coloration: Skin is not jaundiced or pale. Neurological:      General: No focal deficit present. Mental Status: He is alert and oriented to person, place, and time. Psychiatric:         Attention and Perception: Attention and perception normal.         Mood and Affect: Mood normal.         Speech: Speech normal.         Behavior: Behavior normal. Behavior is cooperative. Thought Content: Thought content normal.         Cognition and Memory: Cognition normal.         Judgment: Judgment normal.           ASSESSMENT/PLAN:    ICD-10-CM    1. Anxiety and depression  F41.9     F32.9        Discussed counseling  Agreeable  Will place referral to [facility] and he will reach out to couselors at school  Discussed self care  Start prozac- purpose, side effects, and precautions reviewed.   Follow up in 3 weeks    Orders Placed This Encounter   Medications    FLUoxetine (PROZAC) 10 MG capsule     Sig: Take 1 capsule by mouth daily     Dispense:  30 capsule     Refill:  3     No orders of the defined types were

## 2023-01-21 ENCOUNTER — APPOINTMENT (OUTPATIENT)
Dept: GENERAL RADIOLOGY | Age: 23
End: 2023-01-21
Payer: COMMERCIAL

## 2023-01-21 ENCOUNTER — HOSPITAL ENCOUNTER (EMERGENCY)
Age: 23
Discharge: HOME | End: 2023-01-21
Payer: COMMERCIAL

## 2023-01-21 VITALS
OXYGEN SATURATION: 100 % | SYSTOLIC BLOOD PRESSURE: 115 MMHG | HEART RATE: 78 BPM | RESPIRATION RATE: 15 BRPM | TEMPERATURE: 97 F | DIASTOLIC BLOOD PRESSURE: 68 MMHG
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
## 2023-01-21 DIAGNOSIS — S42.024A CLOSED NONDISPLACED FRACTURE OF SHAFT OF RIGHT CLAVICLE, INITIAL ENCOUNTER: ICD-10-CM

## 2023-01-21 PROCEDURE — 6370000000 HC RX 637 (ALT 250 FOR IP): Performed by: PHYSICIAN ASSISTANT

## 2023-01-21 PROCEDURE — 99283 EMERGENCY DEPT VISIT LOW MDM: CPT

## 2023-01-21 PROCEDURE — 73030 X-RAY EXAM OF SHOULDER: CPT

## 2023-01-21 RX ORDER — HYDROCODONE BITARTRATE AND ACETAMINOPHEN 5; 325 MG/1; MG/1
1 TABLET ORAL EVERY 6 HOURS PRN
Qty: 8 TABLET | Refills: 0 | Status: SHIPPED | OUTPATIENT
Start: 2023-01-21 | End: 2023-01-24

## 2023-01-21 RX ORDER — HYDROCODONE BITARTRATE AND ACETAMINOPHEN 5; 325 MG/1; MG/1
1 TABLET ORAL EVERY 6 HOURS PRN
Qty: 8 TABLET | Refills: 0 | Status: SHIPPED | OUTPATIENT
Start: 2023-01-21 | End: 2023-01-21 | Stop reason: CLARIF

## 2023-01-21 RX ORDER — KETOROLAC TROMETHAMINE 10 MG/1
10 TABLET, FILM COATED ORAL ONCE
Status: COMPLETED | OUTPATIENT
Start: 2023-01-21 | End: 2023-01-21

## 2023-01-21 RX ORDER — HYDROCODONE BITARTRATE AND ACETAMINOPHEN 5; 325 MG/1; MG/1
1 TABLET ORAL EVERY 6 HOURS PRN
Qty: 8 TABLET | Refills: 0 | Status: SHIPPED | OUTPATIENT
Start: 2023-01-21 | End: 2023-01-21 | Stop reason: SDUPTHER

## 2023-01-21 RX ADMIN — KETOROLAC TROMETHAMINE 10 MG: 10 TABLET, FILM COATED ORAL at 14:56

## 2023-01-21 ASSESSMENT — PAIN SCALES - GENERAL
PAINLEVEL_OUTOF10: 7
PAINLEVEL_OUTOF10: 3

## 2023-01-21 ASSESSMENT — PAIN DESCRIPTION - ORIENTATION: ORIENTATION: RIGHT

## 2023-01-21 ASSESSMENT — PAIN - FUNCTIONAL ASSESSMENT: PAIN_FUNCTIONAL_ASSESSMENT: 0-10

## 2023-01-21 ASSESSMENT — PAIN DESCRIPTION - LOCATION: LOCATION: SHOULDER

## 2023-01-21 NOTE — ED PROVIDER NOTES
[facility]  ED  Emergency Department Encounter    Patient Name: Dheeraj Dutta  MRN: [id number]  YOB: 2000  Date of Evaluation: 1/21/2023  Provider: FORD Sterling CNP  Note Started: 3:51 PM EST 1/21/23    CHIEF COMPLAINT  Shoulder Injury (Pt was snowboarding pt fell and landed on his right shoulder. Denies head injury)    SHARED SERVICE VISIT  Evaluated by MARYSOL. My supervising physician was available for consultation. HISTORY OF PRESENT ILLNESS  Dheeraj Dutta is a 20 y.o. male who presents to the ED 1 hour history of right clavicular pain. Patient was brought in by little brother today for evaluation. Patient reports that he was snowboarding this afternoon when he fell and landed on right shoulder awkwardly. States that he had immediate pain and pain since. Rated a 6-7 out of 10. Does not radiate. Worse with touch and movement. He is right-hand dominant. Denies hitting head or losing consciousness. No neck or back pain. No numbness, tingling, weakness of extremity. No other complaints, modifying factors or associated symptoms. Nursing notes reviewed were all reviewed and agreed with or any disagreements were addressed in the HPI.     PMH:  Past Medical History:   Diagnosis Date    Migraines     migraines- saw neurology    SVT (supraventricular tachycardia) ([facility] [place] [address] 2017    cardiac cath with ablation     Surgical History:  Past Surgical History:   Procedure Laterality Date    VENTRICULAR ABLATION SURGERY N/A      Family History:  Family History   Problem Relation Age of Onset    Other Mother     No Known Problems Father     No Known Problems Brother     Other Maternal Grandmother         anxiety    Thyroid Disease Maternal Grandmother     Arthritis Paternal Grandmother         RA    High Blood Pressure Paternal Grandfather     Thyroid Disease Paternal Grandfather     No Known Problems Brother     Colon Cancer Neg Hx     Breast Cancer Neg Hx Arrhythmia Neg Hx     Prostate Cancer Neg Hx      Social History:  Social History     Socioeconomic History    Marital status: Single     Spouse name: Not on file    Number of children: Not on file    Years of education: Not on file    Highest education level: Not on file   Occupational History    Occupation: student   Tobacco Use    Smoking status: Never    Smokeless tobacco: Never   Vaping Use    Vaping Use: Some days    Substances: Occasionally   Substance and Sexual Activity    Alcohol use: Yes     Comment: rare , once every month , family events     Drug use: No    Sexual activity: Yes     Partners: Female     Birth control/protection: Condom   Other Topics Concern    Not on file   Social History Narrative    Plays soccer- trying to gain muscle mass to play in college     does well in school     Social Determinants of Health     Financial Resource Strain: Not on file   Food Insecurity: Not on file   Transportation Needs: Not on file   Physical Activity: Not on file   Stress: Not on file   Social Connections: Not on file   Intimate Partner Violence: Not on file   Housing Stability: Not on file     Current Medications:  No current facility-administered medications for this encounter. Current Outpatient Medications   Medication Sig Dispense Refill    HYDROcodone-acetaminophen (NORCO) 5-325 MG per tablet Take 1 tablet by mouth every 6 hours as needed for Pain for up to 3 days. Intended supply: 3 days. Take lowest dose possible to manage pain Max Daily Amount: 4 tablets 8 tablet 0    FLUoxetine (PROZAC) 10 MG capsule Take 1 capsule by mouth daily 30 capsule 3     Allergies:  No Known Allergies    Screenings:     Maria [place] Carmen Coma Scale  Eye Opening: Spontaneous  Best Verbal Response: Oriented  Best Motor Response: Obeys commands  Largo Coma Scale Score: 15           CIWA Assessment  BP: 115/68  Heart Rate: 78          REVIEW OF SYSTEMS  Positives and Pertinent Negatives as per HPI.     PHYSICAL EXAM  /68 Pulse 78   Temp 97 °F (36.1 °C)   Resp 15   SpO2 100%     GENERAL APPEARANCE: Awake and alert. Cooperative. No acute distress. HEAD: Normocephalic. Atraumatic. EYES:  EOM's grossly intact. ENT: Mucous membranes are moist.   NECK: Supple. No midline bony tenderness. No crepitus, deformity, or step-offs noted. No swelling, bruising, or color change. HEART: No chest wall tenderness. . No murmurs, rubs, or gallops. ABDOMEN: Soft. Non-distended. Non-tender. EXTREMITIES: No peripheral edema. Patient with tenderness to midshaft of the right clavicle. Step-off noted. No significant reproducible tenderness to shoulder. Limited range of motion and strength testing secondary to pain. Radial pulses are +2 and cap refill less than 5 seconds moves all extremities equally. All extremities neurovascularly intact. SKIN: Warm and dry. No acute rashes. NEUROLOGICAL: Alert and oriented. No gross facial drooping. Strength 5/5, sensation intact. EKG  When ordered, EKG's are interpreted by the ED Physician in the absence of a Cardiologist.  Please see their note for interpretation of EKG. LABS  Labs Reviewed - No data to display  When ordered, only abnormal lab results are displayed. All other labs were within normal range or not returned as of this dictation. RADIOLOGY  Non-plain film images such as CT, U/S, and MRI are read by the radiologist.  Plain radiographic images are visualized and preliminarily interpreted by the ED Provider with the below findings:     Findings appear consistent with angulated midshaft right clavicular fracture. Interpretation per the Radiologist below, if available at the time of this note:  XR SHOULDER RIGHT (MIN 2 VIEWS)   Final Result   Acute clavicular fracture. PROCEDURES  Unless otherwise noted below, none.     ED COURSE/DDx/MDM  History obtained from:  Patient    Vitals:  Vitals:    01/21/23 1443   BP: 115/68   Pulse: 78   Resp: 15   Temp: 97 °F (36.1 °C) SpO2: 100%     Patient received following medications in ED:  Medications   ketorolac (TORADOL) tablet 10 mg (10 mg Oral Given 1/21/23 5827)     Sepsis Consideration:  Is this patient to be included in the SEP-1 Core Measure due to severe sepsis or septic shock? No   Exclusion criteria - the patient is NOT to be included for SEP-1 Core Measure due to: Infection is not suspected    Records Reviewed:   None relevant. Chronic conditions affecting care:   None relevant. has a past medical history of Migraines and SVT (supraventricular tachycardia) ([facility] [address] (2017). Social Determinants:   None none    Consults:   None    Reassessment:      Patient continues without significant plaints on reevaluation. MDM/Disposition Considerations:   Briefly Polly Estrella presents for fall while snowboarding onto right arm/shoulder. X-ray imaging consistent with midshaft clavicle fracture. Sling and swath applied by nursing staff. He remained neurovascular intact status post application. Discharged with referral for orthopedic follow-up as well as recommendations for management of fracture in addition to return precautions. Patient is in agreement and comfortable at discharge. .    Critical Care Time:   0 Minutes of critical care time spent not including separately billable procedures. DDx:  I estimate there is LOW risk for COMPARTMENT SYNDROME, DEEP VENOUS THROMBOSIS, SEPTIC ARTHRITIS, TENDON OR NEUROVASCULAR INJURY, thus I consider the discharge disposition reasonable. Ra Lopez and I have also discussed returning to the Emergency Department immediately if new or worsening symptoms occur. We have discussed the symptoms which are most concerning (e.g., changing or worsening pain, numbness, weakness) that necessitate immediate return. FINAL IMPRESSION  1. Fall from snowboard, initial encounter    2.  Closed nondisplaced fracture of shaft of right clavicle, initial encounter      Patient referred to:  Angelica Herrera MD  [place] [address] [address]  [phone]    Schedule an appointment as soon as possible for a visit       [facility]  ED  [facility]  [address]  [phone]    If symptoms worsen    Discharge Medications:   Discharge Medication List as of 1/21/2023  3:08 PM        START taking these medications    Details   HYDROcodone-acetaminophen (NORCO) 5-325 MG per tablet Take 1 tablet by mouth every 6 hours as needed for Pain for up to 3 days. Intended supply: 3 days. Take lowest dose possible to manage pain Max Daily Amount: 4 tablets, Disp-8 tablet, R-0Normal           Discontinued Medications:  Discharge Medication List as of 1/21/2023  3:08 PM        Risk management discussed and shared decision making had with patient and/or surrogate. All questions were answered. Patient will follow up with orthopedist within 3 to 4 days for further evaluation/treatment. All questions answered. Patient will return to ED for new/worsening symptoms. DISPOSITION:  Patient was discharged home in stable condition. (Please note that portions of this note were completed with a voice recognition program.  Efforts were made to edit the dictations but occasionally words are mis-transcribed).          Melisa Lamb [place]  01/21/23 1931

## 2023-01-24 ENCOUNTER — OFFICE VISIT (OUTPATIENT)
Dept: ORTHOPEDIC SURGERY | Age: 23
End: 2023-01-24
Payer: COMMERCIAL

## 2023-01-24 VITALS — BODY MASS INDEX: 20.41 KG/M2 | HEIGHT: 73 IN | WEIGHT: 154 LBS

## 2023-01-24 DIAGNOSIS — S42.021A CLOSED DISPLACED FRACTURE OF SHAFT OF RIGHT CLAVICLE, INITIAL ENCOUNTER: Primary | ICD-10-CM

## 2023-01-24 PROCEDURE — 99203 OFFICE O/P NEW LOW 30 MIN: CPT | Performed by: ORTHOPAEDIC SURGERY

## 2023-01-24 PROCEDURE — 23500 CLTX CLAVICULAR FX W/O MNPJ: CPT | Performed by: ORTHOPAEDIC SURGERY

## 2023-01-24 RX ORDER — IBUPROFEN 200 MG
200 TABLET ORAL EVERY 6 HOURS PRN
COMMUNITY

## 2023-01-24 SDOH — HEALTH STABILITY: PHYSICAL HEALTH: ON AVERAGE, HOW MANY MINUTES DO YOU ENGAGE IN EXERCISE AT THIS LEVEL?: 30 MIN

## 2023-01-24 SDOH — HEALTH STABILITY: PHYSICAL HEALTH: ON AVERAGE, HOW MANY DAYS PER WEEK DO YOU ENGAGE IN MODERATE TO STRENUOUS EXERCISE (LIKE A BRISK WALK)?: 7 DAYS

## 2023-01-24 ASSESSMENT — SOCIAL DETERMINANTS OF HEALTH (SDOH)
WITHIN THE LAST YEAR, HAVE YOU BEEN HUMILIATED OR EMOTIONALLY ABUSED IN OTHER WAYS BY YOUR PARTNER OR EX-PARTNER?: NO
WITHIN THE LAST YEAR, HAVE YOU BEEN AFRAID OF YOUR PARTNER OR EX-PARTNER?: NO
WITHIN THE LAST YEAR, HAVE TO BEEN RAPED OR FORCED TO HAVE ANY KIND OF SEXUAL ACTIVITY BY YOUR PARTNER OR EX-PARTNER?: NO
WITHIN THE LAST YEAR, HAVE YOU BEEN KICKED, HIT, SLAPPED, OR OTHERWISE PHYSICALLY HURT BY YOUR PARTNER OR EX-PARTNER?: NO

## 2023-01-24 NOTE — PROGRESS NOTES
Dr Kyle Son      Date /Time 1/24/2023             4:17 PM EST  Name Mayelin Cesar             2000   Location  [facility]  MRN [id number]                Chief Complaint   Patient presents with    New Patient     NP rt shoulder -clavicle   Injury snowboarding- fall  Has imaging        History of Present Illness    Mayelin Cesar is a 20 y.o. male who presents with  right Shoulder pain. Sent in consultation by FORD Raymundo - MEG, . Athletic/exercise activity: Snowboarding. Injury Mechanism: Snowboarding. Worker's Comp. & legal issues:   none. Previous Treatments: Ice, Heat, and NSAIDs    Patient presents to the office today for a new problem. Patient here with a chief complaint of right shoulder pain. Patient's right shoulder became painful on 1/21/2023. Patient was snowboarding and fell. Pain is concentrated mid clavicle. He did go to the emergency room and was diagnosed with a clavicle fracture. He was placed into a shoulder immobilizer. He denies any neurologic symptoms. No past medical history contributing to the region.     Past Medical History  Past Medical History:   Diagnosis Date    Migraines     migraines- saw neurology    SVT (supraventricular tachycardia) ([facility] [address] 2017    cardiac cath with ablation     Past Surgical History:   Procedure Laterality Date    VENTRICULAR ABLATION SURGERY N/A      Social History     Tobacco Use    Smoking status: Never    Smokeless tobacco: Never   Substance Use Topics    Alcohol use: Yes     Comment: rare , once every month , family events       Current Outpatient Medications on File Prior to Visit   Medication Sig Dispense Refill    ibuprofen (ADVIL;MOTRIN) 200 MG tablet Take 200 mg by mouth every 6 hours as needed for Pain (600-800prn)      FLUoxetine (PROZAC) 10 MG capsule Take 1 capsule by mouth daily 30 capsule 3    HYDROcodone-acetaminophen (NORCO) 5-325 MG per tablet Take 1 tablet by mouth every 6 hours as needed for Pain for up to 3 days. Intended supply: 3 days. Take lowest dose possible to manage pain Max Daily Amount: 4 tablets (Patient not taking: Reported on 1/24/2023) 8 tablet 0     No current facility-administered medications on file prior to visit. ASCVD 10-YEAR RISK SCORE  The ASCVD Risk score (Santana MULLEN, et al., 2019) failed to calculate for the following reasons: The 2019 ASCVD risk score is only valid for ages 36 to 78     Review of Systems  10-point ROS is negative other than HPI. Physical Exam  Based off 1997 Exam Criteria  Ht 6' 1\" (1.854 m)   Wt 154 lb (69.9 kg)   BMI 20.32 kg/m²      Constitutional:       General: He is not in acute distress. Appearance: Normal appearance. Cardiovascular:      Rate and Rhythm: Normal rate and regular rhythm. Pulses: Normal pulses. Pulmonary:      Effort: Pulmonary effort is normal. No respiratory distress. Neurological:      Mental Status: He is alert and oriented to person, place, and time. Mental status is at baseline. Skin: Clean dry and intact.   No open wounds or sores  Lymphatics: No palpable lymph node    Musculoskeletal:  Gait:  normal    Cervical Spine / Shoulder:      RIGHT  LEFT    Cervical Spine Exam  [x] All Neg    [x] All Neg     Spurling's  []  []Not tested   []  []Not tested    David's  []  []Not tested   []  []Not tested    Pain with rotation  []  []Not tested   []  []Not tested    Pain with lateral bending  []  []Not tested   []  []Not tested    Paraspinal muscle tenderness  [] Paraspinal  []Midline   [] Paraspinal  []Not tested    Sensation RIGHT  LEFT    Axillary  [x] Normal []Decreased    [x] Normal []Decreased   Musculocutaneous  [x] Normal  []Decreased   [x] Normal []Decreased   Median  [x] Normal []Decreased   [x] Normal []Decreased   Radial  [x] Normal  []Decreased   [x] Normal []Decreased   Ulnar  [x] Normal  []Decreased   [x] Normal []Decreased   Scapula       Position  [x]Nml  []low  [] lateral  [x]Nml []low  [] lateral   Dyskinesia  []+ []Abn. Shrug   []+ []Abn. Shrug                     Winging     [x]None   []Med  []Lat   []Worse w/FE  []Med  []Lat  []Worse w/FE   Scapulothoracic Compress.    []Impr Pain  []Impr Motion  []Impr Pain []Impr Motion    Range of Motion Active Passive Active Passive   Forward Elevation   170    Abduction   100    External Rotation @ side   60    External Rotation @ 90 abd   90    Internal Rotation @ 90 abd   40    Internal Rotation   Normal    End range of motion  [] Pain  [] Pain  [] Pain  [] Pain   Strength RIGHT /5 LEFT /5   Abduction   5    External Rotation   5    Internal Rotation   5    Provocative Signs/Tests  [] All Neg   [x] +      [] -  [] All Neg   [x] +      [] -   Rotator Cuff Signs  [] All Neg  [] Not tested   [x] All Neg  [] Not tested    Neer  []  []Not tested   []  []Not tested    Carroll Lieberman  []  []Not tested   []  []Not tested    Painful arc  []  []Not tested   []  []Not tested    Greater tuberosity tenderness  []  []Not tested   []  []Not tested    Drop arm  []  []Not tested  []  []Not tested   Superior Escape  []  []Not tested   []  []Not tested    ER Lag  []  []Not tested   []  []Not tested    Belly press  []  []Not tested   []  []Not tested    Lift-off  []  []Not tested   []  []Not tested    Bear hug  []  []Not tested   []  []Not tested    Biceps/Labral Signs  [] All Neg  [] Not tested   [x] All Neg  [] Not tested    Jain's  []  []Not tested   []  []Not tested    Speed's  []  []Not tested   []  []Not tested    Dynamic Load Shift/Shear  []  []Not tested   []  []Not tested    Clicking/Popping  []  []Not tested  []  []Not tested   Bicipital groove tenderness  []  []Not tested   []  []Not tested    Jair  []  []Not tested   []  []Not tested    [facility] Joint Signs  [] All Neg  [] Not tested   [x] All Neg  [] Not tested    [facility] joint tenderness  []  []Not tested   []  []Not tested    Cross-arm adduction pain  []  []Not tested   []  []Not tested    Instability Signs  [] All Neg [] Not tested  [] All Neg  [] Not tested   General laxity (thumb/elbow)  []  []Not tested   []  []Not tested    Hyperabduction  []  []Not tested   []  []Not tested    Sulcus []Side   []ER    []Side   []ER       Anterior apprehension  []  []Not tested   []  []Not tested    Relocation  []   []Not tested  []  []Not tested     Physical exam of the right shoulder does demonstrate tenderness over the midshaft of the clavicle. There is no tenting of the skin. Ecchymosis present. Imaging      X-rays were reviewed of the right shoulder. 3 views. AP, scapular Y, and axillary views. They demonstrate a right shoulder mid shaft clavicle fracture with angulation. Procedure:  No orders of the defined types were placed in this encounter. Assessment and Plan  [place] Necessary was seen today for new patient. Diagnoses and all orders for this visit:    Closed displaced fracture of shaft of right clavicle, initial encounter      Patient and I have had a long conversation concerning the positive negatives with surgery versus conservative care. Patient and I have agreed that conservative care is probably the best.  Patient will follow-up in 1 week for x-rays. Continuance shoulder immobilizer. Remove to work on hand wrist and elbow range of motion only    I discussed with Vj Luis that his history, symptoms, signs, and imaging are most consistent with  clavicle fracture . We reviewed the natural history of these conditions and treatment options ranging from conservative measures (rest, icing, activity modification, physical therapy, pain meds, cortisone injection) to surgical options. In terms of treatment, I recommended continuing with rest, icing, avoidance of painful activities, NSAIDs or pain meds as tolerated, and physical therapy. We discussed surgical options as well, should conservative measures fail.      Electronically signed by Adrian Andrews MD on 1/24/2023 at 4:17 PM  This dictation was generated by voice recognition computer software. Although all attempts are made to edit the dictation for accuracy, there may be errors in the transcription that are not intended.

## 2023-01-31 ENCOUNTER — OFFICE VISIT (OUTPATIENT)
Dept: ORTHOPEDIC SURGERY | Age: 23
End: 2023-01-31

## 2023-01-31 VITALS — WEIGHT: 154 LBS | BODY MASS INDEX: 20.41 KG/M2 | HEIGHT: 73 IN

## 2023-01-31 DIAGNOSIS — S42.021A CLOSED DISPLACED FRACTURE OF SHAFT OF RIGHT CLAVICLE, INITIAL ENCOUNTER: Primary | ICD-10-CM

## 2023-01-31 PROCEDURE — 99024 POSTOP FOLLOW-UP VISIT: CPT | Performed by: PHYSICIAN ASSISTANT

## 2023-01-31 NOTE — PROGRESS NOTES
Dr Branden Jorgensen      Date /Time 1/31/2023             4:17 PM EST  Name Lilia [facility]             2000   Location  [facility]  MRN [id number]                Chief Complaint   Patient presents with    Follow-up     Right Clavicle Fracture         History of Present Illness    Portillo Weiner is a 20 y.o. male who presents with  right Shoulder pain. Athletic/exercise activity: Snowboarding. Injury Mechanism: Snowboarding. Worker's Comp. & legal issues:   none. Previous Treatments: Ice, Heat, and NSAIDs    Patient presents the office today for a follow-up visit. Patient being treated for a right clavicle fracture. Injury occurred on 1/21/2023. He is doing better at this time. He has less pain. Previous History: Patient presents to the office today for a new problem. Patient here with a chief complaint of right shoulder pain. Patient's right shoulder became painful on 1/21/2023. Patient was snowboarding and fell. Pain is concentrated mid clavicle. He did go to the emergency room and was diagnosed with a clavicle fracture. He was placed into a shoulder immobilizer. He denies any neurologic symptoms. No past medical history contributing to the region.     Past Medical History  Past Medical History:   Diagnosis Date    Migraines     migraines- saw neurology    SVT (supraventricular tachycardia) ([facility] [address] 2017    cardiac cath with ablation     Past Surgical History:   Procedure Laterality Date    VENTRICULAR ABLATION SURGERY N/A      Social History     Tobacco Use    Smoking status: Never    Smokeless tobacco: Never   Substance Use Topics    Alcohol use: Yes     Comment: rare , once every month , family events       Current Outpatient Medications on File Prior to Visit   Medication Sig Dispense Refill    ibuprofen (ADVIL;MOTRIN) 200 MG tablet Take 200 mg by mouth every 6 hours as needed for Pain (600-800prn)      FLUoxetine (PROZAC) 10 MG capsule Take 1 capsule by mouth daily 30 capsule 3     No current facility-administered medications on file prior to visit. ASCVD 10-YEAR RISK SCORE  The ASCVD Risk score (Santana DK, et al., 2019) failed to calculate for the following reasons: The 2019 ASCVD risk score is only valid for ages 36 to 78     Review of Systems  10-point ROS is negative other than HPI. Physical Exam  Based off 1997 Exam Criteria  Ht 6' 1\" (1.854 m)   Wt 154 lb (69.9 kg)   BMI 20.32 kg/m²      Constitutional:       General: He is not in acute distress. Appearance: Normal appearance. Cardiovascular:      Rate and Rhythm: Normal rate and regular rhythm. Pulses: Normal pulses. Pulmonary:      Effort: Pulmonary effort is normal. No respiratory distress. Neurological:      Mental Status: He is alert and oriented to person, place, and time. Mental status is at baseline. Skin: Clean dry and intact. No open wounds or sores  Lymphatics: No palpable lymph node    Musculoskeletal:  Gait:  normal    Cervical Spine / Shoulder:      RIGHT  LEFT    Cervical Spine Exam  [x] All Neg    [x] All Neg     Spurling's  []  []Not tested   []  []Not tested    David's  []  []Not tested   []  []Not tested    Pain with rotation  []  []Not tested   []  []Not tested    Pain with lateral bending  []  []Not tested   []  []Not tested    Paraspinal muscle tenderness  [] Paraspinal  []Midline   [] Paraspinal  []Not tested    Sensation RIGHT  LEFT    Axillary  [x] Normal []Decreased    [x] Normal []Decreased   Musculocutaneous  [x] Normal  []Decreased   [x] Normal []Decreased   Median  [x] Normal []Decreased   [x] Normal []Decreased   Radial  [x] Normal  []Decreased   [x] Normal []Decreased   Ulnar  [x] Normal  []Decreased   [x] Normal []Decreased   Scapula       Position  [x]Nml  []low  [] lateral  [x]Nml  []low  [] lateral   Dyskinesia  []+ []Abn. Shrug   []+ []Abn. Shrug                     Winging     [x]None   []Med  []Lat   []Worse w/FE  []Med  []Lat  []Worse w/FE Scapulothoracic Compress.    []Impr Pain  []Impr Motion  []Impr Pain []Impr Motion    Range of Motion Active Passive Active Passive   Forward Elevation   170    Abduction   100    External Rotation @ side   60    External Rotation @ 90 abd   90    Internal Rotation @ 90 abd   40    Internal Rotation   Normal    End range of motion  [] Pain  [] Pain  [] Pain  [] Pain   Strength RIGHT /5 LEFT /5   Abduction   5    External Rotation   5    Internal Rotation   5    Provocative Signs/Tests  [] All Neg   [x] +      [] -  [] All Neg   [x] +      [] -   Rotator Cuff Signs  [] All Neg  [] Not tested   [x] All Neg  [] Not tested    Neer  []  []Not tested   []  []Not tested    Dom Guerrero  []  []Not tested   []  []Not tested    Painful arc  []  []Not tested   []  []Not tested    Greater tuberosity tenderness  []  []Not tested   []  []Not tested    Drop arm  []  []Not tested  []  []Not tested   Superior Escape  []  []Not tested   []  []Not tested    ER Lag  []  []Not tested   []  []Not tested    Belly press  []  []Not tested   []  []Not tested    Lift-off  []  []Not tested   []  []Not tested    Bear hug  []  []Not tested   []  []Not tested    Biceps/Labral Signs  [] All Neg  [] Not tested   [x] All Neg  [] Not tested    Jain's  []  []Not tested   []  []Not tested    Speed's  []  []Not tested   []  []Not tested    Dynamic Load Shift/Shear  []  []Not tested   []  []Not tested    Clicking/Popping  []  []Not tested  []  []Not tested   Bicipital groove tenderness  []  []Not tested   []  []Not tested    Jair  []  []Not tested   []  []Not tested    [facility] Joint Signs  [] All Neg  [] Not tested   [x] All Neg  [] Not tested    [facility] joint tenderness  []  []Not tested   []  []Not tested    Cross-arm adduction pain  []  []Not tested   []  []Not tested    Instability Signs  [] All Neg  [] Not tested  [] All Neg  [] Not tested   General laxity (thumb/elbow)  []  []Not tested   []  []Not tested    Hyperabduction  []  []Not tested   []  []Not tested    Sulcus []Side   []ER    []Side   []ER       Anterior apprehension  []  []Not tested   []  []Not tested    Relocation  []   []Not tested  []  []Not tested     Physical exam of the right shoulder does demonstrate tenderness over the midshaft of the clavicle. There is no tenting of the skin. Ecchymosis present. Imaging      X-rays were ordered today and reviewed. 2 views. AP and angled AP clavicle films. They demonstrate a midshaft clavicle fracture maintaining good position. The balloon of the proximal fragment has moved superior slightly compared to previous films. This may simply be projection. Procedure:  Orders Placed This Encounter   Procedures    XR SHOULDER RIGHT (MIN 2 VIEWS)     Standing Status:   Future     Standing Expiration Date:   1/25/2024         Assessment and Plan  Edson Paz was seen today for follow-up. Diagnoses and all orders for this visit:    Closed displaced fracture of shaft of right clavicle, initial encounter  -     XR SHOULDER RIGHT (MIN 2 VIEWS); Future          I discussed with Mariola Rivera that his history, symptoms, signs, and imaging are most consistent with  clavicle fracture . We will continue with conservative care but I would like to take an x-ray in a week. If the fracture needs any further I would recommend surgical ORIF. Continue in shoulder immobilizer. Work on hand wrist and elbow motion no range of motion of the shoulder    We reviewed the natural history of these conditions and treatment options ranging from conservative measures (rest, icing, activity modification, physical therapy, pain meds, cortisone injection) to surgical options. In terms of treatment, I recommended continuing with rest, icing, avoidance of painful activities, NSAIDs or pain meds as tolerated, and physical therapy. We discussed surgical options as well, should conservative measures fail.      Electronically signed by Naveed López PA-C on 1/31/2023 at 3:03 PM  This dictation was generated by voice recognition computer software. Although all attempts are made to edit the dictation for accuracy, there may be errors in the transcription that are not intended.

## 2023-02-06 ENCOUNTER — HOSPITAL ENCOUNTER (OUTPATIENT)
Age: 23
Discharge: HOME | End: 2023-02-06
Payer: COMMERCIAL

## 2023-02-06 ENCOUNTER — OFFICE VISIT (OUTPATIENT)
Dept: ORTHOPEDIC SURGERY | Age: 23
End: 2023-02-06

## 2023-02-06 ENCOUNTER — TELEPHONE (OUTPATIENT)
Dept: ORTHOPEDIC SURGERY | Age: 23
End: 2023-02-06

## 2023-02-06 VITALS — BODY MASS INDEX: 20.41 KG/M2 | WEIGHT: 154 LBS | HEIGHT: 73 IN

## 2023-02-06 DIAGNOSIS — Z01.818 PRE-OP TESTING: ICD-10-CM

## 2023-02-06 DIAGNOSIS — S42.021A CLOSED DISPLACED FRACTURE OF SHAFT OF RIGHT CLAVICLE, INITIAL ENCOUNTER: Primary | ICD-10-CM

## 2023-02-06 DIAGNOSIS — S42.021A CLOSED DISPLACED FRACTURE OF SHAFT OF RIGHT CLAVICLE, INITIAL ENCOUNTER: ICD-10-CM

## 2023-02-06 LAB
ANION GAP SERPL CALCULATED.3IONS-SCNC: 13 MMOL/L (ref 3–16)
BASOPHILS ABSOLUTE: 0 K/UL (ref 0–0.2)
BASOPHILS RELATIVE PERCENT: 0.4 %
BILIRUBIN URINE: NEGATIVE
BLOOD, URINE: NEGATIVE
BUN BLDV-MCNC: 16 MG/DL (ref 7–20)
CALCIUM SERPL-MCNC: 10.2 MG/DL (ref 8.3–10.6)
CHLORIDE BLD-SCNC: 102 MMOL/L (ref 99–110)
CLARITY: CLEAR
CO2: 28 MMOL/L (ref 21–32)
COLOR: YELLOW
CREAT SERPL-MCNC: 0.9 MG/DL (ref 0.9–1.3)
EOSINOPHILS ABSOLUTE: 0.1 K/UL (ref 0–0.6)
EOSINOPHILS RELATIVE PERCENT: 2 %
GFR SERPL CREATININE-BSD FRML MDRD: >60 ML/MIN/{1.73_M2}
GLUCOSE BLD-MCNC: 89 MG/DL (ref 70–99)
GLUCOSE URINE: NEGATIVE MG/DL
HCT VFR BLD CALC: 42.3 % (ref 40.5–52.5)
HEMOGLOBIN: 15 G/DL (ref 13.5–17.5)
INR BLD: 1.05 (ref 0.87–1.14)
KETONES, URINE: NEGATIVE MG/DL
LEUKOCYTE ESTERASE, URINE: NEGATIVE
LYMPHOCYTES ABSOLUTE: 1.7 K/UL (ref 1–5.1)
LYMPHOCYTES RELATIVE PERCENT: 27.9 %
MCH RBC QN AUTO: 31 PG (ref 26–34)
MCHC RBC AUTO-ENTMCNC: 35.4 G/DL (ref 31–36)
MCV RBC AUTO: 87.8 FL (ref 80–100)
MICROSCOPIC EXAMINATION: NORMAL
MONOCYTES ABSOLUTE: 0.5 K/UL (ref 0–1.3)
MONOCYTES RELATIVE PERCENT: 9.1 %
NEUTROPHILS ABSOLUTE: 3.6 K/UL (ref 1.7–7.7)
NEUTROPHILS RELATIVE PERCENT: 60.6 %
NITRITE, URINE: NEGATIVE
PDW BLD-RTO: 12.6 % (ref 12.4–15.4)
PH UA: 7 (ref 5–8)
PLATELET # BLD: 280 K/UL (ref 135–450)
PMV BLD AUTO: 7.5 FL (ref 5–10.5)
POTASSIUM SERPL-SCNC: 3.8 MMOL/L (ref 3.5–5.1)
PROTEIN UA: NEGATIVE MG/DL
PROTHROMBIN TIME: 13.7 SEC (ref 11.7–14.5)
RBC # BLD: 4.82 M/UL (ref 4.2–5.9)
SODIUM BLD-SCNC: 143 MMOL/L (ref 136–145)
SPECIFIC GRAVITY UA: 1.02 (ref 1–1.03)
URINE TYPE: NORMAL
UROBILINOGEN, URINE: 0.2 E.U./DL
WBC # BLD: 5.9 K/UL (ref 4–11)

## 2023-02-06 PROCEDURE — 85025 COMPLETE CBC W/AUTO DIFF WBC: CPT

## 2023-02-06 PROCEDURE — 81003 URINALYSIS AUTO W/O SCOPE: CPT

## 2023-02-06 PROCEDURE — 99024 POSTOP FOLLOW-UP VISIT: CPT | Performed by: PHYSICIAN ASSISTANT

## 2023-02-06 PROCEDURE — 99213 OFFICE O/P EST LOW 20 MIN: CPT | Performed by: PHYSICIAN ASSISTANT

## 2023-02-06 PROCEDURE — 85610 PROTHROMBIN TIME: CPT

## 2023-02-06 PROCEDURE — 80048 BASIC METABOLIC PNL TOTAL CA: CPT

## 2023-02-06 PROCEDURE — 36415 COLL VENOUS BLD VENIPUNCTURE: CPT

## 2023-02-06 PROCEDURE — 87086 URINE CULTURE/COLONY COUNT: CPT

## 2023-02-06 NOTE — LETTER
[facility]  Surgery Scheduling Form:    23     DEMOGRAPHICS    Patient Name:  Orrie Primrose [facility]  Patient :  2000   Patient SS#:  [id number]    Patient Phone:  [phone] (home)  Alt. Patient Phone:    Patient Address:  [address]   [address] [address]    PCP:  FORD Keen CNP  Insurance:  Payor: Kortney Cancel / Plan: Barry Comment PPO / Product Type: *No Product type* /   Insurance ID Number:  Payer/Plan Subscr  Sex Relation Sub. Ins. ID Effective Group Num   1.  Joaquina MADDEN 1979 Male Child [id number] 20 [id number]                                    [id number]       DIAGNOSIS & PROCEDURE    Diagnosis: RIGHT  S42.021A Closed clavicle fracture    Operation: RIGHT  53965 open reduction internal fixation clavicle fracture    Provider:  Leonora Donald MD    Location:  [address] [place] [address]    SCHEDULING INFORMATION    Requested Date:  2023   Requested Time:  12:00 pm      Patient Arrival Time:  10:00 am   OR Time Required:  45 Minutes  Admission:  [x]Outpatient   []23 hour  []Same Day Admit:    days  []Inpatient    Anesthesia:  [x]General  []Spinal  []MAC/Sedation  Regional Anesthesia:  [x]None  []Lumbar Plexus Block  []Fascia Iliaca  []Femoral  []Adductor canal  []Interscalene Block  []Insert Catheter       EQUIPMENT    Position:  []Supine  []Lateral  [x]Beach-chair and tenant table attachment[]Prone    OR Bed:  []Regular  []Rio  []La  []Hip olivera  [x]Beach-chair  []Spyder  Radiology:  [x]Large C-arm  []Small C-arm  []Portable X-ray    Instrument Trays:  [x]General ortho set  []Beatriz special hip retractors     Implants:  [employer] Hip:  []Hip Replacement  Synthes:/    SUTURE: []#5 Ethibond  []#2 Ethibond  []#2 Quill  []#1 PDS  [x]#1 Vicryl                   [x]2-0 Vicryl  [x]3-0 Monocryl  []2-0 Nylon  []3-0 Nylon  []3-0 PDS                    [x]Dermabond  []Steri-strips (in half)  []Staples  DRESSING:  []eo dermabond  [x]4x4 gauze  []ABDs [x]Tegaderm  BRACE: []Pelvic Binder  []Hip X-ACT  []Knee TROM  []Knee immobilizer                 []Ice Unit  []Ace-Wrap   []Crutches / Shelagh Hum      [][employer]:  Sagar Hensley [phone], Mena Mendoza. [email or web address]  []Medline: Yaniv Scruggs [phone], [email or web address]  [x]Synthes: Bard Arm [phone]  [][place]: Annalisa Labrum [phone]      Comments:        Jessica Gaspar MD  [address] [facility]  2/6/2023       4:10 PM EST

## 2023-02-06 NOTE — PROGRESS NOTES
Dimitri Clair Holtman    Age 20 y.o.    male    2000    MRN [id number]    2/7/2023  Arrival Time_____________  OR Time____________75 Min     Procedure(s):  OPEN REDUCTION INTERNAL FIXATION RIGHT CLAVICLE FRACTURE                      General    Surgeon(s): Ketty Donovan, MD       Phone [phone] ([place])     [address]  Cell         Work  _____________________________________________________________________  _____________________________________________________________________  _____________________________________________________________________  _____________________________________________________________________  _____________________________________________________________________    PCP _____________________________ Phone_________________     H&P__________________Bringing      Chart            Epic   DOS      Called________  EKG__________________Bringing      Chart            Epic   DOS      Called________  LAB__________________ Bringing      Chart            Epic   DOS      Called________  Cardiac Clearance_______Bringing      Chart            Epic      DOS      Called________    Cardiologist________________________ Phone___________________________    ? [facility] concerns / Waiver on Chart            [employer]________________  ? Pre-op Instructions Given [place]          _________________________________  ? Directions to Surgery Center                          _________________________________  ? Transportation Home_______________      __________________________________  ?  Crutches/Walker__________________        __________________________________    ________Pre-op Orders   _______Transcribed    _______Wt.  ________Pharmacy          _______SCD  ______VTE     ______TED Eulah Delude  _______  Surgery Consent    _______  Anesthesia Consent         COVID DATE______________LOCATION________________ RESULT__________

## 2023-02-06 NOTE — PROGRESS NOTES
Dr Liz Carreno      Date /Time 2/6/2023             4:17 PM EST  Name Justa Jaramillo [facility]             2000   Location  [facility]  MRN [id number]                Chief Complaint   Patient presents with    Follow-up     Right Clavicle         History of Present Illness    Delmer Ladd is a 20 y.o. male who presents with  right Shoulder pain. Athletic/exercise activity: Snowboarding. Injury Mechanism: Snowboarding. Worker's Comp. & legal issues:   none. Previous Treatments: Ice, Heat, and NSAIDs    Patient presents the office today for a follow-up visit. Patient being treated for a right clavicle fracture. Injury occurred on 1/21/2023. Patient is doing no better and no worse. He reports no new injury or trauma. Previous History: Patient presents to the office today for a new problem. Patient here with a chief complaint of right shoulder pain. Patient's right shoulder became painful on 1/21/2023. Patient was snowboarding and fell. Pain is concentrated mid clavicle. He did go to the emergency room and was diagnosed with a clavicle fracture. He was placed into a shoulder immobilizer. He denies any neurologic symptoms. No past medical history contributing to the region.     Past Medical History  Past Medical History:   Diagnosis Date    Migraines     migraines- saw neurology    SVT (supraventricular tachycardia) ([facility] [address] 2017    cardiac cath with ablation     Past Surgical History:   Procedure Laterality Date    VENTRICULAR ABLATION SURGERY N/A      Social History     Tobacco Use    Smoking status: Never    Smokeless tobacco: Never   Substance Use Topics    Alcohol use: Yes     Comment: rare , once every month , family events       Current Outpatient Medications on File Prior to Visit   Medication Sig Dispense Refill    ibuprofen (ADVIL;MOTRIN) 200 MG tablet Take 200 mg by mouth every 6 hours as needed for Pain (600-800prn)      FLUoxetine (PROZAC) 10 MG capsule Take 1 capsule by mouth daily 30 capsule 3     No current facility-administered medications on file prior to visit. ASCVD 10-YEAR RISK SCORE  The ASCVD Risk score (Santana DK, et al., 2019) failed to calculate for the following reasons: The 2019 ASCVD risk score is only valid for ages 36 to 78     Review of Systems  10-point ROS is negative other than HPI. Physical Exam  Based off 1997 Exam Criteria  Ht 6' 1\" (1.854 m)   Wt 154 lb (69.9 kg)   BMI 20.32 kg/m²      Constitutional:       General: He is not in acute distress. Appearance: Normal appearance. Cardiovascular:      Rate and Rhythm: Normal rate and regular rhythm. Pulses: Normal pulses. Pulmonary:      Effort: Pulmonary effort is normal. No respiratory distress. Neurological:      Mental Status: He is alert and oriented to person, place, and time. Mental status is at baseline. Skin: Clean dry and intact. No open wounds or sores  Lymphatics: No palpable lymph node    Musculoskeletal:  Gait:  normal    Cervical Spine / Shoulder:      RIGHT  LEFT    Cervical Spine Exam  [x] All Neg    [x] All Neg     Spurling's  []  []Not tested   []  []Not tested    David's  []  []Not tested   []  []Not tested    Pain with rotation  []  []Not tested   []  []Not tested    Pain with lateral bending  []  []Not tested   []  []Not tested    Paraspinal muscle tenderness  [] Paraspinal  []Midline   [] Paraspinal  []Not tested    Sensation RIGHT  LEFT    Axillary  [x] Normal []Decreased    [x] Normal []Decreased   Musculocutaneous  [x] Normal  []Decreased   [x] Normal []Decreased   Median  [x] Normal []Decreased   [x] Normal []Decreased   Radial  [x] Normal  []Decreased   [x] Normal []Decreased   Ulnar  [x] Normal  []Decreased   [x] Normal []Decreased   Scapula       Position  [x]Nml  []low  [] lateral  [x]Nml  []low  [] lateral   Dyskinesia  []+ []Abn. Shrug   []+ []Abn. Shrug                     Winging     [x]None   []Med  []Lat   []Worse w/FE  []Med  []Lat []Worse w/FE   Scapulothoracic Compress.    []Impr Pain  []Impr Motion  []Impr Pain []Impr Motion    Range of Motion Active Passive Active Passive   Forward Elevation   170    Abduction   100    External Rotation @ side   60    External Rotation @ 90 abd   90    Internal Rotation @ 90 abd   40    Internal Rotation   Normal    End range of motion  [] Pain  [] Pain  [] Pain  [] Pain   Strength RIGHT /5 LEFT /5   Abduction   5    External Rotation   5    Internal Rotation   5    Provocative Signs/Tests  [] All Neg   [x] +      [] -  [] All Neg   [x] +      [] -   Rotator Cuff Signs  [] All Neg  [] Not tested   [x] All Neg  [] Not tested    Neer  []  []Not tested   []  []Not tested    Leidy Hack  []  []Not tested   []  []Not tested    Painful arc  []  []Not tested   []  []Not tested    Greater tuberosity tenderness  []  []Not tested   []  []Not tested    Drop arm  []  []Not tested  []  []Not tested   Superior Escape  []  []Not tested   []  []Not tested    ER Lag  []  []Not tested   []  []Not tested    Belly press  []  []Not tested   []  []Not tested    Lift-off  []  []Not tested   []  []Not tested    Bear hug  []  []Not tested   []  []Not tested    Biceps/Labral Signs  [] All Neg  [] Not tested   [x] All Neg  [] Not tested    Jain's  []  []Not tested   []  []Not tested    Speed's  []  []Not tested   []  []Not tested    Dynamic Load Shift/Shear  []  []Not tested   []  []Not tested    Clicking/Popping  []  []Not tested  []  []Not tested   Bicipital groove tenderness  []  []Not tested   []  []Not tested    Jair  []  []Not tested   []  []Not tested    [facility] Joint Signs  [] All Neg  [] Not tested   [x] All Neg  [] Not tested    [facility] joint tenderness  []  []Not tested   []  []Not tested    Cross-arm adduction pain  []  []Not tested   []  []Not tested    Instability Signs  [] All Neg  [] Not tested  [] All Neg  [] Not tested   General laxity (thumb/elbow)  []  []Not tested   []  []Not tested    Hyperabduction  []  []Not tested   [] []Not tested    Sulcus []Side   []ER    []Side   []ER       Anterior apprehension  []  []Not tested   []  []Not tested    Relocation  []   []Not tested  []  []Not tested     Physical exam of the right shoulder does demonstrate tenderness over the midshaft of the clavicle. There is no tenting of the skin. Ecchymosis present. The clavicle does appear to be more prominent at the fracture site than at previous visits. Imaging      X-rays were ordered today and reviewed. 2 views. AP and angled AP clavicle films. They demonstrate a midshaft clavicle fracture maintaining good position. The proximal fracture fragment has moved superior slightly compared to previous films. Procedure:  Orders Placed This Encounter   Procedures    XR CLAVICLE RIGHT     Standing Status:   Future     Number of Occurrences:   1     Standing Expiration Date:   2/6/2024         Assessment and Plan  Abdelrahman Pena was seen today for follow-up. Diagnoses and all orders for this visit:    Closed displaced fracture of shaft of right clavicle, initial encounter  -     XR CLAVICLE RIGHT; Future          I discussed with Aniyah Contreras that his history, symptoms, signs, and imaging are most consistent with  clavicle fracture . Patient's midshaft clavicle fracture is unstable. The deformity is increasing. I have discussed the case with Dr. Vidhya Cox and we are in agreement surgical ORIF is necessary at this time. He is scheduled for surgery tomorrow. History and physical will be dictated separately. I discussed with Aniyah Contreras that his symptoms, signs, and imaging are most consistent with  clavicle fracture . We reviewed the natural history of these conditions and treatment options ranging from conservative measures (rest, icing, activity modification, physical therapy, pain meds) to surgical options. Conservative measures have failed;.  I think he is an appropriate candidate for surgery due to his young age, ongoing symptoms and dysfunction despite conservative measures. The procedure would be ORIF right clavicle    Perioperative considerations include: Pre-operative clearance from medical subspecialty. We reviewed the risks, benefits, alternatives of this approach. We discussed risks including, but not limited to, bleeding, pain, infection, scarring, damage to the neurovascular structures, blood clots, pulmonary embolus, stiffness, incomplete relief of pain, and incomplete return of function. We also reviewed the surgical details, expected recovery, and rehabilitation (6-9 months). He expressed understanding and will undergo preoperative medical evaluation and optimization. Electronically signed by Jewel Loyd PA-C on 2/6/2023 at 3:51 PM  This dictation was generated by voice recognition computer software. Although all attempts are made to edit the dictation for accuracy, there may be errors in the transcription that are not intended.

## 2023-02-06 NOTE — H&P
Department of Orthopedic Surgery  Physician Assistant   History and Physical        CHIEF COMPLAINT: Right clavicle pain    Reason for Admission: As Above    History Obtained From:  patient    HISTORY OF PRESENT ILLNESS:      The patient is a 20 y.o. male  who presents with chief complaint of right clavicle pain. Date of injury 1/21/2023. This is a preoperative history and physical.  Patient has planned surgery with Dr. Cristofer Ross on 2/7/2023 for ORIF clavicle fracture. Patient denies any fever, chills, or constitutional symptoms. Past Medical History:        Diagnosis Date    Migraines     migraines- saw neurology    SVT (supraventricular tachycardia) ([facility] [address] 2017    cardiac cath with ablation     Past Surgical History:        Procedure Laterality Date    VENTRICULAR ABLATION SURGERY N/A          Medications Prior to Admission:   Prior to Admission medications    Medication Sig Start Date End Date Taking? Authorizing Provider   ibuprofen (ADVIL;MOTRIN) 200 MG tablet Take 200 mg by mouth every 6 hours as needed for Pain (600-800prn)    Historical Provider, MD   FLUoxetine (PROZAC) 10 MG capsule Take 1 capsule by mouth daily 8/26/21   FORD Pratt - CNP       Allergies:  Patient has no known allergies. Social History:    Tobacco:  reports that he has never smoked. He has never used smokeless tobacco.   Alcohol:  reports current alcohol use.    Illicit Drug: No  Family History:       Problem Relation Age of Onset    Other Mother     No Known Problems Father     No Known Problems Brother     Other Maternal Grandmother         anxiety    Thyroid Disease Maternal Grandmother     Arthritis Paternal Grandmother         RA    High Blood Pressure Paternal Grandfather     Thyroid Disease Paternal Grandfather     No Known Problems Brother     Colon Cancer Neg Hx     Breast Cancer Neg Hx     Arrhythmia Neg Hx     Prostate Cancer Neg Hx      REVIEW OF SYSTEMS:  CONSTITUTIONAL:  negative  MUSCULOSKELETAL: positive for  pain    PHYSICAL EXAM:  Admission weight:       VITALS:  There were no vitals taken for this visit. CONSTITUTIONAL:  awake, alert, cooperative, no apparent distress, and appears stated age    MUSCULOSKELETAL: Physical exam of the right clavicle does show deformity without tenting of the skin. Tenderness over the mid clavicle.     Heart: Regular rate and rhythm    Lungs: Clear to auscultation bilaterally    Abdomen: Soft and nontender bowel sounds present    DATA:  CBC:   Lab Results   Component Value Date/Time    WBC 7.4 07/09/2020 03:47 AM    RBC 5.09 07/09/2020 03:47 AM    HGB 15.8 07/09/2020 03:47 AM    HCT 45.7 07/09/2020 03:47 AM    MCV 89.9 07/09/2020 03:47 AM    MCH 31.1 07/09/2020 03:47 AM    MCHC 34.6 07/09/2020 03:47 AM    RDW 13.2 07/09/2020 03:47 AM     07/09/2020 03:47 AM    MPV 7.7 07/09/2020 03:47 AM     BMP:    Lab Results   Component Value Date/Time     07/24/2020 10:06 PM    K 4.2 07/24/2020 10:06 PM     07/24/2020 10:06 PM    CO2 28 07/24/2020 10:06 PM    BUN 13 07/24/2020 10:06 PM    LABALBU 4.6 09/12/2017 09:25 AM    CREATININE 0.9 07/24/2020 10:06 PM    CALCIUM 10.1 07/24/2020 10:06 PM    GFRAA >60 07/24/2020 10:06 PM    LABGLOM >60 07/24/2020 10:06 PM    GLUCOSE 86 07/24/2020 10:06 PM     PT/INR:    Lab Results   Component Value Date/Time    PROTIME 13.7 02/06/2023 04:39 PM    INR 1.05 02/06/2023 04:39 PM       Radiology:  No orders to display         ASSESSMENT: Right clavicle fracture    PLAN:  1) plan for surgical ORIF with Dr. Jennifer Morgan on 2/7/2023          Jennie Meredith PA-C

## 2023-02-06 NOTE — TELEPHONE ENCOUNTER
[phone] NPR   For surgery 02/07/2023 ORIF Right Clavicle    Genevieve 4:25pm - 4:32 pm   OP      jm

## 2023-02-06 NOTE — PROGRESS NOTES

## 2023-02-07 ENCOUNTER — APPOINTMENT (OUTPATIENT)
Dept: GENERAL RADIOLOGY | Age: 23
End: 2023-02-07
Attending: ORTHOPAEDIC SURGERY
Payer: COMMERCIAL

## 2023-02-07 ENCOUNTER — HOSPITAL ENCOUNTER (OUTPATIENT)
Age: 23
Setting detail: OUTPATIENT SURGERY
Discharge: HOME | End: 2023-02-07
Attending: ORTHOPAEDIC SURGERY | Admitting: ORTHOPAEDIC SURGERY
Payer: COMMERCIAL

## 2023-02-07 ENCOUNTER — ANESTHESIA (OUTPATIENT)
Dept: OPERATING ROOM | Age: 23
End: 2023-02-07
Payer: COMMERCIAL

## 2023-02-07 ENCOUNTER — ANESTHESIA EVENT (OUTPATIENT)
Dept: OPERATING ROOM | Age: 23
End: 2023-02-07
Payer: COMMERCIAL

## 2023-02-07 VITALS
RESPIRATION RATE: 11 BRPM | OXYGEN SATURATION: 100 % | TEMPERATURE: 96 F | BODY MASS INDEX: 20.67 KG/M2 | DIASTOLIC BLOOD PRESSURE: 71 MMHG | SYSTOLIC BLOOD PRESSURE: 117 MMHG | HEART RATE: 63 BPM | WEIGHT: 156 LBS | HEIGHT: 73 IN

## 2023-02-07 DIAGNOSIS — S42.021D CLOSED DISPLACED FRACTURE OF SHAFT OF RIGHT CLAVICLE WITH ROUTINE HEALING, SUBSEQUENT ENCOUNTER: Primary | ICD-10-CM

## 2023-02-07 LAB — URINE CULTURE, ROUTINE: NORMAL

## 2023-02-07 PROCEDURE — 6360000002 HC RX W HCPCS: Performed by: PHYSICIAN ASSISTANT

## 2023-02-07 PROCEDURE — 73000 X-RAY EXAM OF COLLAR BONE: CPT

## 2023-02-07 PROCEDURE — 3600000004 HC SURGERY LEVEL 4 BASE: Performed by: ORTHOPAEDIC SURGERY

## 2023-02-07 PROCEDURE — 2500000003 HC RX 250 WO HCPCS: Performed by: ORTHOPAEDIC SURGERY

## 2023-02-07 PROCEDURE — C1713 ANCHOR/SCREW BN/BN,TIS/BN: HCPCS | Performed by: ORTHOPAEDIC SURGERY

## 2023-02-07 PROCEDURE — 6360000002 HC RX W HCPCS

## 2023-02-07 PROCEDURE — 2720000010 HC SURG SUPPLY STERILE: Performed by: ORTHOPAEDIC SURGERY

## 2023-02-07 PROCEDURE — 6370000000 HC RX 637 (ALT 250 FOR IP): Performed by: ANESTHESIOLOGY

## 2023-02-07 PROCEDURE — 7100000001 HC PACU RECOVERY - ADDTL 15 MIN: Performed by: ORTHOPAEDIC SURGERY

## 2023-02-07 PROCEDURE — 2500000003 HC RX 250 WO HCPCS

## 2023-02-07 PROCEDURE — A4217 STERILE WATER/SALINE, 500 ML: HCPCS | Performed by: ORTHOPAEDIC SURGERY

## 2023-02-07 PROCEDURE — 7100000010 HC PHASE II RECOVERY - FIRST 15 MIN: Performed by: ORTHOPAEDIC SURGERY

## 2023-02-07 PROCEDURE — 2580000003 HC RX 258: Performed by: PHYSICIAN ASSISTANT

## 2023-02-07 PROCEDURE — 2709999900 HC NON-CHARGEABLE SUPPLY: Performed by: ORTHOPAEDIC SURGERY

## 2023-02-07 PROCEDURE — 7100000011 HC PHASE II RECOVERY - ADDTL 15 MIN: Performed by: ORTHOPAEDIC SURGERY

## 2023-02-07 PROCEDURE — 7100000000 HC PACU RECOVERY - FIRST 15 MIN: Performed by: ORTHOPAEDIC SURGERY

## 2023-02-07 PROCEDURE — 2580000003 HC RX 258: Performed by: ORTHOPAEDIC SURGERY

## 2023-02-07 PROCEDURE — 3700000000 HC ANESTHESIA ATTENDED CARE: Performed by: ORTHOPAEDIC SURGERY

## 2023-02-07 PROCEDURE — 3600000014 HC SURGERY LEVEL 4 ADDTL 15MIN: Performed by: ORTHOPAEDIC SURGERY

## 2023-02-07 PROCEDURE — 3700000001 HC ADD 15 MINUTES (ANESTHESIA): Performed by: ORTHOPAEDIC SURGERY

## 2023-02-07 PROCEDURE — 3209999900 FLUORO FOR SURGICAL PROCEDURES

## 2023-02-07 DEVICE — SCREW BNE L14MM DIA2.7MM CORT S STL ST T8 STARDRV RECESS: Type: IMPLANTABLE DEVICE | Site: CLAVICLE | Status: FUNCTIONAL

## 2023-02-07 DEVICE — SCREW BNE L12MM DIA2.7MM ANK S STL ST VAR ANG LOK FULL THRD: Type: IMPLANTABLE DEVICE | Site: CLAVICLE | Status: FUNCTIONAL

## 2023-02-07 DEVICE — SCREW BNE L14MM DIA2.7MM MTPHSEAL S STL ST FULL THRD T8: Type: IMPLANTABLE DEVICE | Site: CLAVICLE | Status: FUNCTIONAL

## 2023-02-07 DEVICE — SCREW BNE L12MM DIA2.7MM CORT S STL ST T8 STARDRV RECESS: Type: IMPLANTABLE DEVICE | Site: CLAVICLE | Status: FUNCTIONAL

## 2023-02-07 DEVICE — PLATE CLAV 2.7MM VA LCP CS1 RT: Type: IMPLANTABLE DEVICE | Site: CLAVICLE | Status: FUNCTIONAL

## 2023-02-07 DEVICE — SCREW BNE L12MM DIA2.7MM MTPHSEAL S STL ST FULL THRD T8: Type: IMPLANTABLE DEVICE | Site: CLAVICLE | Status: FUNCTIONAL

## 2023-02-07 RX ORDER — BUPIVACAINE HYDROCHLORIDE 2.5 MG/ML
INJECTION, SOLUTION EPIDURAL; INFILTRATION; INTRACAUDAL PRN
Status: DISCONTINUED | OUTPATIENT
Start: 2023-02-07 | End: 2023-02-07 | Stop reason: ALTCHOICE

## 2023-02-07 RX ORDER — SODIUM CHLORIDE 0.9 % (FLUSH) 0.9 %
5-40 SYRINGE (ML) INJECTION EVERY 12 HOURS SCHEDULED
Status: DISCONTINUED | OUTPATIENT
Start: 2023-02-07 | End: 2023-02-07 | Stop reason: HOSPADM

## 2023-02-07 RX ORDER — OXYCODONE HYDROCHLORIDE 5 MG/1
5 TABLET ORAL PRN
Status: COMPLETED | OUTPATIENT
Start: 2023-02-07 | End: 2023-02-07

## 2023-02-07 RX ORDER — MIDAZOLAM HYDROCHLORIDE 1 MG/ML
2 INJECTION INTRAMUSCULAR; INTRAVENOUS
Status: DISCONTINUED | OUTPATIENT
Start: 2023-02-07 | End: 2023-02-07 | Stop reason: HOSPADM

## 2023-02-07 RX ORDER — ONDANSETRON 2 MG/ML
4 INJECTION INTRAMUSCULAR; INTRAVENOUS
Status: DISCONTINUED | OUTPATIENT
Start: 2023-02-07 | End: 2023-02-07 | Stop reason: HOSPADM

## 2023-02-07 RX ORDER — OXYCODONE HYDROCHLORIDE 5 MG/1
5 TABLET ORAL EVERY 6 HOURS PRN
Qty: 28 TABLET | Refills: 0 | Status: SHIPPED | OUTPATIENT
Start: 2023-02-07 | End: 2023-02-14

## 2023-02-07 RX ORDER — MIDAZOLAM HYDROCHLORIDE 1 MG/ML
INJECTION INTRAMUSCULAR; INTRAVENOUS PRN
Status: DISCONTINUED | OUTPATIENT
Start: 2023-02-07 | End: 2023-02-07 | Stop reason: SDUPTHER

## 2023-02-07 RX ORDER — SODIUM CHLORIDE 9 MG/ML
INJECTION, SOLUTION INTRAVENOUS PRN
Status: DISCONTINUED | OUTPATIENT
Start: 2023-02-07 | End: 2023-02-07 | Stop reason: HOSPADM

## 2023-02-07 RX ORDER — ROCURONIUM BROMIDE 10 MG/ML
INJECTION, SOLUTION INTRAVENOUS PRN
Status: DISCONTINUED | OUTPATIENT
Start: 2023-02-07 | End: 2023-02-07 | Stop reason: SDUPTHER

## 2023-02-07 RX ORDER — MEPERIDINE HYDROCHLORIDE 50 MG/ML
12.5 INJECTION INTRAMUSCULAR; INTRAVENOUS; SUBCUTANEOUS EVERY 5 MIN PRN
Status: DISCONTINUED | OUTPATIENT
Start: 2023-02-07 | End: 2023-02-07 | Stop reason: HOSPADM

## 2023-02-07 RX ORDER — LIDOCAINE HYDROCHLORIDE 20 MG/ML
INJECTION, SOLUTION INFILTRATION; PERINEURAL PRN
Status: DISCONTINUED | OUTPATIENT
Start: 2023-02-07 | End: 2023-02-07 | Stop reason: SDUPTHER

## 2023-02-07 RX ORDER — CEPHALEXIN 500 MG/1
500 CAPSULE ORAL 4 TIMES DAILY
Qty: 4 CAPSULE | Refills: 0 | Status: SHIPPED | OUTPATIENT
Start: 2023-02-07

## 2023-02-07 RX ORDER — ASPIRIN 81 MG/1
81 TABLET ORAL 2 TIMES DAILY
Qty: 60 TABLET | Refills: 0 | Status: SHIPPED | OUTPATIENT
Start: 2023-02-08

## 2023-02-07 RX ORDER — PROPOFOL 10 MG/ML
INJECTION, EMULSION INTRAVENOUS PRN
Status: DISCONTINUED | OUTPATIENT
Start: 2023-02-07 | End: 2023-02-07 | Stop reason: SDUPTHER

## 2023-02-07 RX ORDER — MELOXICAM 15 MG/1
15 TABLET ORAL DAILY
Qty: 30 TABLET | Refills: 0 | Status: SHIPPED | OUTPATIENT
Start: 2023-02-07

## 2023-02-07 RX ORDER — OXYCODONE HYDROCHLORIDE 5 MG/1
10 TABLET ORAL PRN
Status: COMPLETED | OUTPATIENT
Start: 2023-02-07 | End: 2023-02-07

## 2023-02-07 RX ORDER — MAGNESIUM HYDROXIDE 1200 MG/15ML
LIQUID ORAL CONTINUOUS PRN
Status: COMPLETED | OUTPATIENT
Start: 2023-02-07 | End: 2023-02-07

## 2023-02-07 RX ORDER — DIPHENHYDRAMINE HYDROCHLORIDE 50 MG/ML
12.5 INJECTION INTRAMUSCULAR; INTRAVENOUS
Status: DISCONTINUED | OUTPATIENT
Start: 2023-02-07 | End: 2023-02-07 | Stop reason: HOSPADM

## 2023-02-07 RX ORDER — HYDRALAZINE HYDROCHLORIDE 20 MG/ML
10 INJECTION INTRAMUSCULAR; INTRAVENOUS
Status: DISCONTINUED | OUTPATIENT
Start: 2023-02-07 | End: 2023-02-07 | Stop reason: HOSPADM

## 2023-02-07 RX ORDER — SODIUM CHLORIDE, SODIUM LACTATE, POTASSIUM CHLORIDE, CALCIUM CHLORIDE 600; 310; 30; 20 MG/100ML; MG/100ML; MG/100ML; MG/100ML
INJECTION, SOLUTION INTRAVENOUS CONTINUOUS
Status: DISCONTINUED | OUTPATIENT
Start: 2023-02-07 | End: 2023-02-07 | Stop reason: HOSPADM

## 2023-02-07 RX ORDER — DEXAMETHASONE SODIUM PHOSPHATE 10 MG/ML
4 INJECTION INTRAMUSCULAR; INTRAVENOUS
Status: DISCONTINUED | OUTPATIENT
Start: 2023-02-07 | End: 2023-02-07 | Stop reason: HOSPADM

## 2023-02-07 RX ORDER — ACETAMINOPHEN 325 MG/1
650 TABLET ORAL
Status: DISCONTINUED | OUTPATIENT
Start: 2023-02-07 | End: 2023-02-07 | Stop reason: HOSPADM

## 2023-02-07 RX ORDER — ONDANSETRON 4 MG/1
4 TABLET, FILM COATED ORAL 3 TIMES DAILY PRN
Qty: 15 TABLET | Refills: 0 | Status: SHIPPED | OUTPATIENT
Start: 2023-02-07

## 2023-02-07 RX ORDER — SODIUM CHLORIDE 0.9 % (FLUSH) 0.9 %
5-40 SYRINGE (ML) INJECTION PRN
Status: DISCONTINUED | OUTPATIENT
Start: 2023-02-07 | End: 2023-02-07 | Stop reason: HOSPADM

## 2023-02-07 RX ORDER — HYDROMORPHONE HCL 110MG/55ML
PATIENT CONTROLLED ANALGESIA SYRINGE INTRAVENOUS PRN
Status: DISCONTINUED | OUTPATIENT
Start: 2023-02-07 | End: 2023-02-07 | Stop reason: SDUPTHER

## 2023-02-07 RX ORDER — IPRATROPIUM BROMIDE AND ALBUTEROL SULFATE 2.5; .5 MG/3ML; MG/3ML
1 SOLUTION RESPIRATORY (INHALATION)
Status: DISCONTINUED | OUTPATIENT
Start: 2023-02-07 | End: 2023-02-07 | Stop reason: HOSPADM

## 2023-02-07 RX ORDER — CEFAZOLIN SODIUM IN 0.9 % NACL 2 G/100 ML
2000 PLASTIC BAG, INJECTION (ML) INTRAVENOUS
Status: COMPLETED | OUTPATIENT
Start: 2023-02-07 | End: 2023-02-07

## 2023-02-07 RX ORDER — PHENYLEPHRINE HCL IN 0.9% NACL 1 MG/10 ML
SYRINGE (ML) INTRAVENOUS PRN
Status: DISCONTINUED | OUTPATIENT
Start: 2023-02-07 | End: 2023-02-07 | Stop reason: SDUPTHER

## 2023-02-07 RX ADMIN — Medication 200 MCG: at 12:19

## 2023-02-07 RX ADMIN — ROCURONIUM BROMIDE 50 MG: 10 SOLUTION INTRAVENOUS at 12:00

## 2023-02-07 RX ADMIN — SODIUM CHLORIDE: 9 INJECTION, SOLUTION INTRAVENOUS at 11:55

## 2023-02-07 RX ADMIN — Medication 2000 MG: at 11:56

## 2023-02-07 RX ADMIN — OXYCODONE HYDROCHLORIDE 5 MG: 5 TABLET ORAL at 14:48

## 2023-02-07 RX ADMIN — PROPOFOL 250 MG: 10 INJECTION, EMULSION INTRAVENOUS at 12:00

## 2023-02-07 RX ADMIN — MIDAZOLAM HYDROCHLORIDE 2 MG: 2 INJECTION, SOLUTION INTRAMUSCULAR; INTRAVENOUS at 11:56

## 2023-02-07 RX ADMIN — LIDOCAINE HYDROCHLORIDE 50 MG: 20 INJECTION, SOLUTION INFILTRATION; PERINEURAL at 12:00

## 2023-02-07 RX ADMIN — Medication 200 MCG: at 12:41

## 2023-02-07 RX ADMIN — HYDROMORPHONE HYDROCHLORIDE 1 MG: 2 INJECTION, SOLUTION INTRAMUSCULAR; INTRAVENOUS; SUBCUTANEOUS at 13:15

## 2023-02-07 RX ADMIN — SUGAMMADEX 200 MG: 100 INJECTION, SOLUTION INTRAVENOUS at 13:06

## 2023-02-07 RX ADMIN — HYDROMORPHONE HYDROCHLORIDE 1 MG: 2 INJECTION, SOLUTION INTRAMUSCULAR; INTRAVENOUS; SUBCUTANEOUS at 12:11

## 2023-02-07 ASSESSMENT — PAIN DESCRIPTION - DESCRIPTORS: DESCRIPTORS: ACHING;THROBBING

## 2023-02-07 ASSESSMENT — PAIN SCALES - GENERAL
PAINLEVEL_OUTOF10: 7
PAINLEVEL_OUTOF10: 6

## 2023-02-07 ASSESSMENT — PAIN DESCRIPTION - ORIENTATION
ORIENTATION: RIGHT
ORIENTATION: RIGHT

## 2023-02-07 ASSESSMENT — PAIN - FUNCTIONAL ASSESSMENT: PAIN_FUNCTIONAL_ASSESSMENT: 0-10

## 2023-02-07 ASSESSMENT — PAIN DESCRIPTION - LOCATION
LOCATION: SHOULDER
LOCATION: SHOULDER

## 2023-02-07 NOTE — DISCHARGE INSTRUCTIONS
Shoulder  Instructions    Surgical Site Care:  Keep incision clean and dry. May shower on post-op day #3 with waterproof dressing on. Change to new waterproof dressing between 5-7 days. Physical Therapy:  No weight bearing on surgical arm  Precautions  Per Physical Therapy handout  Remove arm from sling and work on hand wrist and elbow range of motion. No range of motion of the shoulder. Sling to be applied at all times except for hygiene  Pain Medications  You were given oxycodone (Oxycontin, Oxyir)  Wean off pain medications as you deem appropriate as long as pain is under control  Contact the Office if you notice any reactions to the medication or need a refill  Be sure to drink plenty of fluids (recommend water) while taking narcotic pain medications to prevent constipation  You may take an over the counter laxative or stool softener as needed to prevent/treat constipation as well, we recommend Senokot S OTC. Aspirin 81 mg twice a day starting on 2/8/2023  Cold packs/Ice packs/Machine  May be used as much as necessary to control swelling/inflammation/soreness  Be sure to have a barrier (cloth, clothing, towel) between the site and the ice pack to prevent frostbite  Contact the office if  Increased redness, swelling, drainage of any kind, and/or pain to surgery site. As well as new onset fevers and or chills. These could signify an infection. Arm tenderness to touch as well as increased swelling or redness. This could signify a clot formation. Numbness or tingling to an area around the incision site or below the incision site (fingers). Any rash appears, increased  or new onset nausea/vomiting occur. This may indicate a reaction to a medication. Phone # [phone]  Follow up with Dr. Felipe Lopez or Zack Howard PA-C at scheduled appointment time.    Please continue to use your Incentive Spirometer at home every hour while awake

## 2023-02-07 NOTE — ANESTHESIA PRE PROCEDURE
Department of Anesthesiology  Preprocedure Note       Name:  Delmer Ladd   Age:  20 y.o.  :  2000                                          MRN:  [id number]         Date:  2023      Surgeon: Christina Lang): Liz Carreno MD    Procedure: Procedure(s):  OPEN REDUCTION INTERNAL FIXATION RIGHT CLAVICLE FRACTURE    Medications prior to admission:   Prior to Admission medications    Medication Sig Start Date End Date Taking?  Authorizing Provider   ibuprofen (ADVIL;MOTRIN) 200 MG tablet Take 200 mg by mouth every 6 hours as needed for Pain (600-800prn)    Historical Provider, MD   FLUoxetine (PROZAC) 10 MG capsule Take 1 capsule by mouth daily 21   Junnie East, APRN - CNP       Current medications:    Current Facility-Administered Medications   Medication Dose Route Frequency Provider Last Rate Last Admin    sodium chloride flush 0.9 % injection 5-40 mL  5-40 mL IntraVENous 2 times per day Rawland Leathaen, PA-C        sodium chloride flush 0.9 % injection 5-40 mL  5-40 mL IntraVENous PRN Rawland Hymen, PA-C        0.9 % sodium chloride infusion   IntraVENous PRN Abi Albert, PA-C        ceFAZolin (ANCEF) 2000 mg in 0.9% sodium chloride 100 mL IVPB  2,000 mg IntraVENous On Call to [address] [employer], JONATHON           Allergies:  No Known Allergies    Problem List:    Patient Active Problem List   Diagnosis Code    Classical migraine with intractable migraine G43.119    SVT [facility]ra[facility] tachycardia) ([facility] I47.1    S/P ablation of ventricular arrhythmia Z98.890, Z86.79    Closed displaced fracture of shaft of right clavicle S42.021A       Past Medical History:        Diagnosis Date    Migraines     migraines- saw neurology    SVT (supraventricular tachycardia) ([facility] [address] 2017    cardiac cath with ablation       Past Surgical History:        Procedure Laterality Date    VENTRICULAR ABLATION SURGERY N/A        Social History:    Social History     Tobacco Use    Smoking status: Never    Smokeless tobacco: Never   Substance Use Topics    Alcohol use: Yes     Comment: 2 drinks per month                                Counseling given: Not Answered      Vital Signs (Current):   Vitals:    02/07/23 0857   Weight: 156 lb (70.8 kg)   Height: 6' 1\" (1.854 m)                                              BP Readings from Last 3 Encounters:   01/21/23 115/68   07/24/20 124/72   07/09/20 (!) 116/59       NPO Status:                                                                                 BMI:   Wt Readings from Last 3 Encounters:   02/07/23 156 lb (70.8 kg)   02/06/23 154 lb (69.9 kg)   01/31/23 154 lb (69.9 kg)     Body mass index is 20.58 kg/m². CBC:   Lab Results   Component Value Date/Time    WBC 5.9 02/06/2023 04:39 PM    RBC 4.82 02/06/2023 04:39 PM    HGB 15.0 02/06/2023 04:39 PM    HCT 42.3 02/06/2023 04:39 PM    MCV 87.8 02/06/2023 04:39 PM    RDW 12.6 02/06/2023 04:39 PM     02/06/2023 04:39 PM       CMP:   Lab Results   Component Value Date/Time     02/06/2023 04:39 PM    K 3.8 02/06/2023 04:39 PM     02/06/2023 04:39 PM    CO2 28 02/06/2023 04:39 PM    BUN 16 02/06/2023 04:39 PM    CREATININE 0.9 02/06/2023 04:39 PM    GFRAA >60 07/24/2020 10:06 PM    AGRATIO 1.8 09/12/2017 09:25 AM    LABGLOM >60 02/06/2023 04:39 PM    GLUCOSE 89 02/06/2023 04:39 PM    PROT 7.2 09/12/2017 09:25 AM    CALCIUM 10.2 02/06/2023 04:39 PM    BILITOT 0.5 09/12/2017 09:25 AM    ALKPHOS 75 09/12/2017 09:25 AM    AST 16 09/12/2017 09:25 AM    ALT 15 09/12/2017 09:25 AM       POC Tests: No results for input(s): POCGLU, POCNA, POCK, POCCL, POCBUN, POCHEMO, POCHCT in the last 72 hours.     Coags:   Lab Results   Component Value Date/Time    PROTIME 13.7 02/06/2023 04:39 PM    INR 1.05 02/06/2023 04:39 PM       HCG (If Applicable): No results found for: PREGTESTUR, PREGSERUM, HCG, HCGQUANT     ABGs: No results found for: PHART, PO2ART, NVB4OFO, WTI6LGS, BEART, T2MWGULL     Type & Screen (If Applicable):  No results found for: LABABO, LABRH    Drug/Infectious Status (If Applicable):  No results found for: HIV, HEPCAB    COVID-19 Screening (If Applicable):   Lab Results   Component Value Date/Time    COVID19 DETECTED 11/04/2020 11:04 AM           Anesthesia Evaluation  Patient summary reviewed and Nursing notes reviewed  Airway: Mallampati: II  TM distance: >3 FB   Neck ROM: full  Mouth opening: > = 3 FB   Dental: normal exam         Pulmonary:Negative Pulmonary ROS and normal exam                               Cardiovascular:Negative CV ROS                      Neuro/Psych:   (+) headaches:,             GI/Hepatic/Renal: Neg GI/Hepatic/Renal ROS            Endo/Other: Negative Endo/Other ROS                    Abdominal:             Vascular: negative vascular ROS. Other Findings:           Anesthesia Plan      general     ASA 2       Induction: intravenous. MIPS: Postoperative opioids intended. Anesthetic plan and risks discussed with patient. Plan discussed with CRNA.     Attending anesthesiologist reviewed and agrees with Preprocedure content                SHANNAN Sheehan MD   2/7/2023

## 2023-02-07 NOTE — H&P
Update History & Physical     The patient's History and Physical of yesterday was reviewed with the patient and I examined the patient. There was no change. The surgical site was confirmed by the patient and me. Plan: The risks, benefits, expected outcome, and alternative to the recommended procedure have been discussed with the patient / family. Patient understands and wants to proceed with the procedure.       Electronically signed by Leonora Donald MD on 2/7/2023 at 11:50 AM

## 2023-02-07 NOTE — PROGRESS NOTES

## 2023-02-07 NOTE — OP NOTE
Orthopaedic Surgery  Operative Report      Patient Name:  Arminda Isaac [facility]  Patient :  2000  MRN: [id number]    Date: 23     Pre-operative Diagnosis:   S42.022A Closed right midshaft clavicle fracture, initial encounter    Post-operative Diagnosis:    Same    Procedure: RIGHT  09491 Open reduction internal fixation of clavicle fracture    Surgeon:  Surgeon(s) and Role:     * John Schneider MD - Primary    Assistant: Circulator: Denis Acevedo RN; Rohit Shipman RN  Surgical Assistant: Ameya Tam  Radiology Technologist: Maryetta Cogan  Scrub Person First: Aparna Morning    Anesthesia: General endotracheal anesthesia, memo-incisional Marcaine injection    Estimated blood loss: 25    Specimens: * No specimens in log *    Complications: None    Drains: None    Condition: Stable    Implants:   Implant Name Type Inv. Item Serial No.  Lot No. LRB No. Used Action   PLATE CLAV 5.1MI VA LCP CS1 RT - [id number]  PLATE CLAV 8.7ZS VA LCP CS1 RT  [facility]  Right 1 Implanted   SCREW BNE L12MM DIA2.7MM LANI S STL ST T8 [employer]AR[employer] RECESS - [id number]  SCREW BNE L12MM DIA2.7MM LANI S STL ST T8 STARDRV RECESS  [employer]  Right 2 Implanted   SCREW BNE L12MM DIA2.7MM MTPHSEAL S STL ST FULL THRD T8 - [id number]  SCREW BNE L12MM DIA2.7MM MTPHSEAL S STL ST FULL THRD T8  [employer]  Right 2 Implanted   SCREW BNE L14MM DIA2.7MM LANI S STL ST T8 STAR[employer] RECESS - [id number]  SCREW BNE L14MM DIA2.7MM LANI S STL ST T8 STARDRV RECESS  DEPU[employer]  Right 1 Implanted   SCREW BNE L14MM DIA2.7MM MTPHSEAL S STL ST FULL THRD T8 - [id number]  SCREW BNE L14MM DIA2.7MM MTPHSEAL S STL ST FULL THRD T8  [employer]  Right 2 Implanted   SCREW BNE L12MM DIA2.7MM ANK S STL ST ARLEY ANG ARTURO FULL THRD - [id number]  SCREW BNE L12MM DIA2.7MM ANK S STL ST ARLEY ANG ARTURO FULL THRD  [employer]  Right 1 Implanted     Findings:   1. Displaced fracture of the midshaft clavicle  2. Increased displacement and angulation following initial injury 2 weeks ago     Indications:   Patient sustained a fall. He sustained a displaced fracture of the RIGHT clavicle. We initially managed the injury closed considering the fact that it was not fully displaced, and angulated within an acceptable limit. However, subsequent x-rays demonstrated increased displacement as well as angulation. At this point, surgery was recommended. We discussed at length the surgical implications including infection and skin numbness around the area. We also discussed long-term sequela from potential nonunion from nonoperative treatment with increasing displacement, as well as loss of overhead endurance function. They understood the risks, benefits, and alternatives and wanted to proceed. Procedure Details:   I marked the RIGHT shoulder as the operative limb. She was wheeled back to the operating theater laid supine the table. bony promises well-padded. General anesthesia was induced. She was sitting up 80 degrees on a well-padded table. The head and neck was secured with the appropriate olievra. Right upper extremity was prepped and draped in standard sterile fashion. Timeout was performed. Appropriate weight-based Ancef antibiotics were given. 1 g of tranexamic acid was given at start. Reduction internal fixation right clavicle fracture:  I made a superior incision over the clavicle. I raised thick soft tissue flaps over the platysma, although he has very little subcutaneous tissue. I incised the platysma layer. Underlying periosteal layers were dissected. The fracture was then identified and mobilized. Provisional reduction was then performed. Plate apposition was performed. Several screws medial and lateral were placed in compression mode. Small comminution was present as the nature of the injury was subacute. Closure and dressing: Thorough irrigation was performed.   Incision platysma layer was approximated with #1 Vicryl. Overlying 2-0 Vicryl sutures were placed in the subcutaneous tissue, followed by 4-0 Monocryl skin. Dermabond and Steri-strip was applied. The shoulder was sterilely dressed, placed in a shoulder immobilizer, abduction pillow. Post-operative Details:   Postoperative rehabilitation: he will remain in a sling for 3-4 weeks. PROM exercises for 4 weeks. No strengthening for at least 3-4 months. AXILLARY CARE IMPORTANT.       Electronically signed by Enrique Guaman MD on 2/7/2023 at 1:47 PM

## 2023-02-07 NOTE — PROGRESS NOTES
Obstructive Sleep Apnea (LEIGHA) Screening     Patient:  Allison Power    YOB: 2000      Medical Record #:  [id number]                     Date:  2/7/2023     1. Are you a loud and/or regular snorer? []  Yes       [x] No    2. Have you been observed to gasp or stop breathing during sleep? []  Yes       [x] No    3. Do you feel tired or groggy upon awakening or do you awaken with a headache?           []  Yes       [] No    4. Are you often tired or fatigued during the wake time hours? []  Yes       [] No    5. Do you fall asleep sitting, reading, watching TV or driving? []  Yes       [] No    6. Do you often have problems with memory or concentration? []  Yes       [] No    **If patient's score is ? 3 they are considered high risk for LEIGHA. An Anesthesia provider will evaluate the patient and develop a plan of care the day of surgery. Note:  If the patient's BMI is more than 35 kg m¯² , has neck circumference > 40 cm, and/or high blood pressure the risk is greater (© American Sleep Apnea Association, 2006).

## 2023-02-07 NOTE — PROGRESS NOTES

## 2023-02-07 NOTE — ANESTHESIA POSTPROCEDURE EVALUATION
Department of Anesthesiology  Postprocedure Note    Patient: Rajwinder Solis  MRN: [id number]  YOB: 2000  Date of evaluation: 2/7/2023      Procedure Summary     Date: 02/07/23 Room / Location: [address] [place] [address]    Anesthesia Start: 8027 Anesthesia Stop: 0658    Procedure: OPEN REDUCTION INTERNAL FIXATION RIGHT CLAVICLE FRACTURE (Right: Shoulder) Diagnosis:       Closed displaced fracture of shaft of right clavicle, initial encounter      (CLOSED RIGHT CLAVICLE FRACTURE)    Surgeons: Jojo Cancnio MD Responsible Provider: Ion Silver MD    Anesthesia Type: general ASA Status: 2          Anesthesia Type: No value filed.     Dona Phase I: Dona Score: 9    Dona Phase II:        Anesthesia Post Evaluation    Patient location during evaluation: PACU  Patient participation: complete - patient participated  Level of consciousness: awake  Pain score: 0  Airway patency: patent  Nausea & Vomiting: no nausea  Complications: no  Cardiovascular status: blood pressure returned to baseline  Respiratory status: acceptable  Hydration status: euvolemic

## 2023-02-13 ENCOUNTER — TELEPHONE (OUTPATIENT)
Dept: ORTHOPEDIC SURGERY | Age: 23
End: 2023-02-13

## 2023-02-13 NOTE — TELEPHONE ENCOUNTER
Other PATIENT CALLED STATES THAT HE WAS TOLD TO CALL IF HIS DRESSING BECAME LOOSE OR UNDONE. HE IS SCHEDULED TO COME IN Thursday, CA THIS WAIT OR SHOULD HE COME IN.  [facility] CALL TO ADVISE [phone]

## 2023-02-16 ENCOUNTER — OFFICE VISIT (OUTPATIENT)
Dept: ORTHOPEDIC SURGERY | Age: 23
End: 2023-02-16

## 2023-02-16 VITALS — BODY MASS INDEX: 20.67 KG/M2 | HEIGHT: 73 IN | WEIGHT: 156 LBS

## 2023-02-16 DIAGNOSIS — S42.021A CLOSED DISPLACED FRACTURE OF SHAFT OF RIGHT CLAVICLE, INITIAL ENCOUNTER: Primary | ICD-10-CM

## 2023-02-16 NOTE — PROGRESS NOTES
Dr Trudy Hernandez      Date /Time 2/16/2023       3:49 PM EST  Name Julianna Mckay             2000   Location  [place]  MRN [id number]                No chief complaint on file. History of Present Illness      Julianna Mckay is a 20 y.o. male is here for post-op visit after RIGHT    Patient presents the office today. Patient is 9 days status post ORIF right clavicle fracture. Patient doing well. Pain controlled. Patient denies any fever, chills, or drainage    Physical Exam    Based off 1997 Exam Criteria    There were no vitals taken for this visit. Constitutional:       General: He is not in acute distress. Appearance: Normal appearance. RIGHT Shoulder: incision clean, intact, healing appropriately. No surrounding  erythema or fluctuance. Neuro intact distal. No evidence of DVT. Imaging       X-rays were ordered and reviewed of the right clavicle. 2 views. AP and angled AP. They demonstrate a midshaft clavicle fracture with hardware in place. Fracture and hardware maintained in appropriate position. Assessment and Plan    Diagnoses and all orders for this visit:    Closed displaced fracture of shaft of right clavicle, initial encounter        Patient can discontinue the pillow at this time. No range of motion of the shoulder at this time including pendulums. He can remove the sling several times a day and work on hand wrist and elbow range of motion. Continue nonweightbearing right upper extremity. Follow-up in 3 weeks for x-rays and hopefully discontinuation of sling. Electronically signed by Trudy Hernandez MD on 2/16/2023 at 3:49 PM  This dictation was generated by voice recognition computer software. Although all attempts are made to edit the dictation for accuracy, there may be errors in the transcription that are not intended.

## 2023-03-07 ENCOUNTER — OFFICE VISIT (OUTPATIENT)
Dept: ORTHOPEDIC SURGERY | Age: 23
End: 2023-03-07

## 2023-03-07 VITALS — HEIGHT: 73 IN | BODY MASS INDEX: 20.67 KG/M2 | WEIGHT: 156 LBS

## 2023-03-07 DIAGNOSIS — S42.021A CLOSED DISPLACED FRACTURE OF SHAFT OF RIGHT CLAVICLE, INITIAL ENCOUNTER: Primary | ICD-10-CM

## 2023-03-07 PROCEDURE — 99024 POSTOP FOLLOW-UP VISIT: CPT | Performed by: PHYSICIAN ASSISTANT

## 2023-03-07 NOTE — PROGRESS NOTES
Dr Matilda Brink      Date /Time 3/7/2023       3:49 PM EST  Name Elizabeth Ludwig [facility]             2000   Location  [place]  MRN [id number]                Chief Complaint   Patient presents with    Follow-up     ORIF Right Clavicle 02/07/2023        History of Present Illness      Cassie Leiva is a 20 y.o. male is here for post-op visit after RIGHT    Patient presents the office today. Patient is 4 weeks status post ORIF right clavicle fracture. Patient doing well. Pain controlled. Patient denies any fever, chills, or drainage    Physical Exam    Based off 1997 Exam Criteria    Ht 6' 1\" (1.854 m)   Wt 156 lb (70.8 kg)   BMI 20.58 kg/m²      Constitutional:       General: He is not in acute distress. Appearance: Normal appearance. RIGHT Shoulder: incision clean, intact, healing appropriately. No surrounding  erythema or fluctuance. Neuro intact distal. No evidence of DVT. Imaging       X-rays were ordered and reviewed of the right clavicle. 2 views. AP and angled AP. They demonstrate a midshaft clavicle fracture with hardware in place. Fracture and hardware maintained in appropriate position. Assessment and Plan    Chantelle Ng was seen today for follow-up. Diagnoses and all orders for this visit:    Closed displaced fracture of shaft of right clavicle, initial encounter  -     XR CLAVICLE RIGHT; Future    Patient can discontinue his sling today. He can now start working on light range of motion exercise of the shoulder. Continue hand wrist and elbow range of motion exercises. No lifting with the right upper extremity. Follow-up in 4 weeks for x-rays and hopefully start therapy if needed. Electronically signed by Mary Arguello PA-C on 3/7/2023 at 3:31 PM  This dictation was generated by voice recognition computer software. Although all attempts are made to edit the dictation for accuracy, there may be errors in the transcription that are not intended.

## 2023-04-04 ENCOUNTER — OFFICE VISIT (OUTPATIENT)
Dept: ORTHOPEDIC SURGERY | Age: 23
End: 2023-04-04

## 2023-04-04 VITALS — BODY MASS INDEX: 20.67 KG/M2 | WEIGHT: 156 LBS | HEIGHT: 73 IN

## 2023-04-04 DIAGNOSIS — S42.021A CLOSED DISPLACED FRACTURE OF SHAFT OF RIGHT CLAVICLE, INITIAL ENCOUNTER: Primary | ICD-10-CM

## 2023-04-04 PROCEDURE — 99024 POSTOP FOLLOW-UP VISIT: CPT | Performed by: PHYSICIAN ASSISTANT

## 2023-04-04 NOTE — PROGRESS NOTES
Dr Blaire Antonio      Date /Time 4/4/2023       3:49 PM EST  Name Renato Little [facility]             2000   Location  [place]  MRN [id number]                Chief Complaint   Patient presents with    Follow-up     ORIF Right Clavicle 02/07/2023        History of Present Illness      Mariola Rivera is a 20 y.o. male is here for post-op visit after RIGHT    Patient presents the office today. Patient is 8 weeks status post ORIF right clavicle fracture. Patient doing well. Pain controlled. Patient denies any fever, chills, or drainage    Physical Exam    Based off 1997 Exam Criteria    Ht 6' 1\" (1.854 m)   Wt 156 lb (70.8 kg)   BMI 20.58 kg/m²      Constitutional:       General: He is not in acute distress. Appearance: Normal appearance. RIGHT Shoulder: incision clean, intact, healing appropriately. No surrounding  erythema or fluctuance. Neuro intact distal. No evidence of DVT. Imaging       X-rays were ordered and reviewed of the right clavicle. 2 views. AP and angled AP. They demonstrate a midshaft clavicle fracture with hardware in place. Fracture and hardware maintained in appropriate position. Assessment and Plan    Edson Paz was seen today for follow-up. Diagnoses and all orders for this visit:    Closed displaced fracture of shaft of right clavicle, initial encounter  -     XR CLAVICLE RIGHT; Future      Patient doing well. Patient can start physical therapy. We will see the patient back in 6 weeks or sooner if problems arise. Avoid heavy lifting and sports still. Electronically signed by Naveed López PA-C on 4/4/2023 at 1:40 PM  This dictation was generated by voice recognition computer software. Although all attempts are made to edit the dictation for accuracy, there may be errors in the transcription that are not intended.

## 2023-04-19 ENCOUNTER — HOSPITAL ENCOUNTER (OUTPATIENT)
Dept: PHYSICAL THERAPY | Age: 23
Setting detail: THERAPIES SERIES
Discharge: HOME | End: 2023-04-19
Payer: COMMERCIAL

## 2023-04-19 PROCEDURE — 97140 MANUAL THERAPY 1/> REGIONS: CPT

## 2023-04-19 PROCEDURE — 97110 THERAPEUTIC EXERCISES: CPT

## 2023-04-19 NOTE — FLOWSHEET NOTE
[facility], [address] [place]  Phone: [phone]  Fax [phone]        Date:  2023    Patient Name:  Kait Blanchard    :  2000  MRN: [id number]  Restrictions/Precautions:    Medical/Treatment Diagnosis Information:  Medical Diagnosis: Closed displaced fracture of shaft of right clavicle, initial encounter [S42.021A]             Treatment Diagnosis: M25.511 - Pain in right shoulder. Insurance/Certification information:  BCBS. Deductible MET. 36 visits. 10% co-payment. Physician Information:  Mamie Underwood PA-C  Has the plan of care been signed (Y/N):        [x]  Yes  []  No     Date of Patient follow up with Physician: 23      Is this a Progress Report:     []  Yes  [x]  No        If Yes:  Date Range for reporting period:  Beginnin/10/23  Ending    Progress report will be due (10 Rx or 30 days whichever is less): 91/15/61       Recertification will be due (POC Duration  / 90 days whichever is less): 23      Visit # Insurance Allowable Auth Required   In-person 2 36 visits []  Yes [x]  No    Telehealth   []  Yes []  No    Total            Functional Scale: Quick DASH SUM 17 (13.6%)   Date assessed: 04/10/23         Number of Comorbidities:  []0     [x]1-2    []3+    Latex Allergy:  [x]NO      []YES  Preferred Language for Healthcare:   [x]English       []other:      Pain level:      SUBJECTIVE:  The patient states they are doing well. No pain reported in their R clavicle. Patient reports some tightness in their R posterior shoulder.      OBJECTIVE:   Observation:   Test measurements:          04/10/23 04/10/23   ROM Left Right   Shoulder Flex 180 degrees 180 degrees   Shoulder Abd 180 degrees 180 degrees   Shoulder ER 90 degrees 110 degrees   Shoulder IR 90 degrees 85 degrees           Strength  Left Right   Shoulder Flex 5/5     Shoulder Scap 5/5     Shoulder Abd.  5/5

## 2023-04-26 ENCOUNTER — APPOINTMENT (OUTPATIENT)
Dept: PHYSICAL THERAPY | Age: 23
End: 2023-04-26
Payer: COMMERCIAL

## 2023-05-03 ENCOUNTER — HOSPITAL ENCOUNTER (OUTPATIENT)
Dept: PHYSICAL THERAPY | Age: 23
Setting detail: THERAPIES SERIES
Discharge: HOME | End: 2023-05-03
Payer: COMMERCIAL

## 2023-05-03 PROCEDURE — 97110 THERAPEUTIC EXERCISES: CPT

## 2023-05-03 PROCEDURE — 97112 NEUROMUSCULAR REEDUCATION: CPT

## 2023-05-03 NOTE — PROGRESS NOTES
[facility], [address], [address] [place] [address]  Phone: [phone]  Fax [phone]    Physical Therapy Re-Certification Plan of Care/MD UPDATE      Dear Talisha Sanders PA-C ,    We had the pleasure of treating the following patient for physical therapy services at [address] [place] [address]. A summary of our findings can be found in the updated assessment below. This includes our plan of care. If you have any questions or concerns regarding these findings, please do not hesitate to contact me at the office phone number checked above. Thank you for the referral.     Physician Signature:________________________________Date:__________________  By signing above (or electronic signature), therapists plan is approved by physician    Date Range Of Visits: 04/10/23 - 23  Total Visits to Date: 3  Overall Response to Treatment:   [x]Patient is responding well to treatment and improvement is noted with regards  to goals   []Patient should continue to improve in reasonable time if they continue HEP   []Patient has plateaued and is no longer responding to skilled PT intervention    []Patient is getting worse and would benefit from return to referring MD   []Patient unable to adhere to initial POC   []Other:             Date:  5/3/2023    Patient Name:  João Pham    :  2000  MRN: [id number]  Restrictions/Precautions:    Medical/Treatment Diagnosis Information:  Medical Diagnosis: Closed displaced fracture of shaft of right clavicle, initial encounter [S42.021A]             Treatment Diagnosis: M25.511 - Pain in right shoulder. Insurance/Certification information:  BCBS. Deductible MET. 36 visits. 10% co-payment.      Physician Information:  Kayli Diane PA-C  Has the plan of care been signed (Y/N):        [x]  Yes  []  No     Date of Patient follow up with Physician: 23      Is this a Progress Report:

## 2023-05-16 ENCOUNTER — OFFICE VISIT (OUTPATIENT)
Dept: ORTHOPEDIC SURGERY | Age: 23
End: 2023-05-16
Payer: COMMERCIAL

## 2023-05-16 ENCOUNTER — HOSPITAL ENCOUNTER (OUTPATIENT)
Dept: PHYSICAL THERAPY | Age: 23
Setting detail: THERAPIES SERIES
Discharge: HOME | End: 2023-05-16
Payer: COMMERCIAL

## 2023-05-16 VITALS — HEIGHT: 73 IN | BODY MASS INDEX: 20.67 KG/M2 | WEIGHT: 156 LBS

## 2023-05-16 DIAGNOSIS — S42.021A CLOSED DISPLACED FRACTURE OF SHAFT OF RIGHT CLAVICLE, INITIAL ENCOUNTER: Primary | ICD-10-CM

## 2023-05-16 PROCEDURE — 99213 OFFICE O/P EST LOW 20 MIN: CPT | Performed by: PHYSICIAN ASSISTANT

## 2023-05-16 PROCEDURE — 97110 THERAPEUTIC EXERCISES: CPT

## 2023-05-16 NOTE — FLOWSHEET NOTE
[facility], [address]  Phone: [phone]  Fax [phone]    Physical Therapy Re-Certification Plan of Care/MD UPDATE        Date:  2023    Patient Name:  Portillo Weiner    :  2000  MRN: [id number]  Restrictions/Precautions:    Medical/Treatment Diagnosis Information:  Medical Diagnosis: Closed displaced fracture of shaft of right clavicle, initial encounter [S42.021A]             Treatment Diagnosis: M25.511 - Pain in right shoulder. Insurance/Certification information:  BCBS. Deductible MET. 36 visits. 10% co-payment. Physician Information:  Paris Do PA-C  Has the plan of care been signed (Y/N):        [x]  Yes  []  No     Date of Patient follow up with Physician: 23      Is this a Progress Report:     []  Yes  [x]  No        If Yes:  Date Range for reporting period:  Beginnin23  Ending:     Progress report will be due (10 Rx or 30 days whichever is less):       Recertification will be due (POC Duration  / 90 days whichever is less): 23      Visit # Insurance Allowable Auth Required   In-person 4 36 visits []  Yes [x]  No    Telehealth   []  Yes []  No    Total            Functional Scale: Quick DASH SUM 17 (13.6%)   Date assessed: 04/10/23   Functional Scale: Quick DASH SUM 13 (4.5%)   Date assessed: 23        Number of Comorbidities:  []0     [x]1-2    []3+    Latex Allergy:  [x]NO      []YES  Preferred Language for Healthcare:   [x]English       []other:      Pain level:      SUBJECTIVE:  The patient states they are doing well. No pain reported in their R clavicle. Patient was told they can start lifting heaving items with progression and to not perform contact sports yet at today's doctors appointment.      OBJECTIVE:   Observation:   Test measurements:          04/10/23 04/10/23 05/03/23   ROM Left Right RIGHT   Shoulder Flex 180 degrees

## 2023-05-16 NOTE — PROGRESS NOTES
Dr Fidel Torres      Date /Time 5/16/2023       3:49 PM EST  Name Betty Mcconnell [facility]             2000   Location  [place]  MRN [id number]                Chief Complaint   Patient presents with    Follow-up     Ck ORIF Right Clavicle         History of Present Illness      Loli Bautista is a 20 y.o. male is here for post-op visit after RIGHT    Patient presents the office today. Patient doing well. Pain controlled. Patient denies any fever, chills, or drainage patient is just over 3 months from surgical date. Doing well. No pain today. Physical Exam    Based off 1997 Exam Criteria    Ht 6' 1\" (1.854 m)   Wt 156 lb (70.8 kg)   BMI 20.58 kg/m²      Constitutional:       General: He is not in acute distress. Appearance: Normal appearance. RIGHT Shoulder: incision clean, intact, healing appropriately. No surrounding  erythema or fluctuance. Neuro intact distal. No evidence of DVT. No tenderness over fracture site. Imaging       X-rays were ordered and reviewed of the right clavicle. 2 views. AP and angled AP. They demonstrate a midshaft clavicle fracture with hardware in place. Fracture and hardware maintained in appropriate position. Diminished fracture line when compared to previous films    Assessment and Plan    Sonido Velasco was seen today for follow-up. Diagnoses and all orders for this visit:    Closed displaced fracture of shaft of right clavicle, initial encounter  -     XR CLAVICLE RIGHT; Future      Patient is doing much better. He should continue with exercises that he has learned in physical therapy. He is over 3 months from surgery. He can gradually advance activities as tolerated. He should return to heavy lifting and contact sports last.  Follow-up as needed. We can remove the hardware but would like it to be in place for at least 12 months.     I discussed with Loli Bautista that his history, symptoms, signs, and imaging are most consistent

## 2023-07-21 ENCOUNTER — PATIENT MESSAGE (OUTPATIENT)
Dept: FAMILY MEDICINE CLINIC | Age: 23
End: 2023-07-21

## (undated) DEVICE — INTENDED FOR TISSUE SEPARATION, AND OTHER PROCEDURES THAT REQUIRE A SHARP SURGICAL BLADE TO PUNCTURE OR CUT.: Brand: BARD-PARKER ® STAINLESS STEEL BLADES

## (undated) DEVICE — DRESSING FOAM SELF ADH 20X10 CM ABSORBENT MEPILEX BORDER

## (undated) DEVICE — SLING ARM UNIV BLK DONJOY ULTRASLING PRO

## (undated) DEVICE — SOLUTION IV IRRIG 500ML 0.9% SODIUM CHL 2F7123

## (undated) DEVICE — SUTURE VCRL SZ 1 L27IN ABSRB VLT L36MM CT-1 1/2 CIR J341H

## (undated) DEVICE — DRAPE C ARM W46XL120IN XLN

## (undated) DEVICE — CONVERTORS STOCKINETTE: Brand: CONVERTORS

## (undated) DEVICE — CHLORAPREP 26ML ORANGE

## (undated) DEVICE — 40763 BEACH CHAIR HEAD RESTRAINT: Brand: 40763 BEACH CHAIR HEAD RESTRAINT

## (undated) DEVICE — ADHESIVE SKIN CLSR 0.7ML TOP DERMBND ADV

## (undated) DEVICE — SYRINGE IRRIG 60ML SFT PLIABLE BLB EZ TO GRP 1 HND USE W/

## (undated) DEVICE — 3M™ COBAN™ NL STERILE NON-LATEX SELF-ADHERENT WRAP, 2084S, 4 IN X 5 YD (10 CM X 4,5 M), 18 ROLLS/CASE: Brand: 3M™ COBAN™

## (undated) DEVICE — SUTURE VCRL + SZ 1 L27IN ABSRB VLT L36MM CT-1 1/2 CIR VCP341H

## (undated) DEVICE — DRAPE,U/ SHT,SPLIT,PLAS,STERIL: Brand: MEDLINE

## (undated) DEVICE — SUTURE VCRL + SZ 2-0 L18IN ABSRB UD CT1 L36MM 1/2 CIR VCP839D

## (undated) DEVICE — FLUID CONTROL SHOULDER DRAPE PACK: Brand: CONVERTORS

## (undated) DEVICE — BIT DRL QC 2X110 MM 30 MM CALIB NS

## (undated) DEVICE — MINOR SET UP PACK: Brand: MEDLINE INDUSTRIES, INC.

## (undated) DEVICE — PENCIL ES CRD L10FT HND SWCHING ROCK SWCH W/ EDGE COAT BLDE

## (undated) DEVICE — GLOVE SURG SZ 75 L12IN FNGR THK94MIL STD WHT LTX FREE

## (undated) DEVICE — ELECTRODE PT RET AD L9FT HI MOIST COND ADH HYDRGEL CORDED

## (undated) DEVICE — SPONGE LAP W18XL18IN WHT COT 4 PLY FLD STRUNG RADPQ DISP ST

## (undated) DEVICE — 3M™ STERI-DRAPE™ U-DRAPE, LONG 1019: Brand: STERI-DRAPE™

## (undated) DEVICE — GOWN,SIRUS,NON REINFRCD,LARGE,SET IN SL: Brand: MEDLINE

## (undated) DEVICE — SHEET,DRAPE,53X77,STERILE: Brand: MEDLINE

## (undated) DEVICE — SUTURE MCRYL + SZ 4-0 L18IN ABSRB UD L19MM PS-2 3/8 CIR MCP496G

## (undated) DEVICE — GLOVE SURG SZ 8 L12IN FNGR THK94MIL STD WHT LTX FREE

## (undated) DEVICE — 3M™ IOBAN™ 2 ANTIMICROBIAL INCISE DRAPE 6650EZ: Brand: IOBAN™ 2

## (undated) DEVICE — TUBING, SUCTION, 3/16" X 12', STRAIGHT: Brand: MEDLINE